# Patient Record
Sex: FEMALE | Race: WHITE | NOT HISPANIC OR LATINO | Employment: FULL TIME | URBAN - METROPOLITAN AREA
[De-identification: names, ages, dates, MRNs, and addresses within clinical notes are randomized per-mention and may not be internally consistent; named-entity substitution may affect disease eponyms.]

---

## 2018-06-08 ENCOUNTER — APPOINTMENT (OUTPATIENT)
Dept: RADIOLOGY | Facility: CLINIC | Age: 53
End: 2018-06-08
Payer: COMMERCIAL

## 2018-06-08 ENCOUNTER — OFFICE VISIT (OUTPATIENT)
Dept: OBGYN CLINIC | Facility: CLINIC | Age: 53
End: 2018-06-08
Payer: COMMERCIAL

## 2018-06-08 VITALS
BODY MASS INDEX: 28.49 KG/M2 | SYSTOLIC BLOOD PRESSURE: 116 MMHG | HEIGHT: 68 IN | DIASTOLIC BLOOD PRESSURE: 72 MMHG | WEIGHT: 188 LBS | HEART RATE: 78 BPM

## 2018-06-08 DIAGNOSIS — M54.5 RIGHT LOW BACK PAIN, UNSPECIFIED CHRONICITY, WITH SCIATICA PRESENCE UNSPECIFIED: ICD-10-CM

## 2018-06-08 DIAGNOSIS — M53.3 SI (SACROILIAC) JOINT DYSFUNCTION: ICD-10-CM

## 2018-06-08 DIAGNOSIS — M62.830 LUMBAR PARASPINAL MUSCLE SPASM: ICD-10-CM

## 2018-06-08 DIAGNOSIS — M54.16 LUMBAR RADICULOPATHY: Primary | ICD-10-CM

## 2018-06-08 PROCEDURE — 72110 X-RAY EXAM L-2 SPINE 4/>VWS: CPT

## 2018-06-08 PROCEDURE — 99204 OFFICE O/P NEW MOD 45 MIN: CPT | Performed by: FAMILY MEDICINE

## 2018-06-08 RX ORDER — CYCLOBENZAPRINE HCL 10 MG
10 TABLET ORAL 3 TIMES DAILY PRN
Qty: 30 TABLET | Refills: 0 | Status: SHIPPED | OUTPATIENT
Start: 2018-06-08 | End: 2018-08-23

## 2018-06-08 RX ORDER — MELOXICAM 7.5 MG/1
7.5 TABLET ORAL 2 TIMES DAILY PRN
Qty: 60 TABLET | Refills: 0 | Status: SHIPPED | OUTPATIENT
Start: 2018-06-08 | End: 2018-08-23

## 2018-06-08 RX ORDER — PREDNISONE 20 MG/1
40 TABLET ORAL DAILY
Qty: 10 TABLET | Refills: 0 | Status: SHIPPED | OUTPATIENT
Start: 2018-06-08 | End: 2018-06-13

## 2018-06-08 NOTE — PROGRESS NOTES
Assessment:     1  Lumbar radiculopathy    2  Right low back pain, unspecified chronicity, with sciatica presence unspecified    3  SI (sacroiliac) joint dysfunction    4  Lumbar paraspinal muscle spasm        Plan:     Problem List Items Addressed This Visit     Lumbar paraspinal muscle spasm    Relevant Medications    cyclobenzaprine (FLEXERIL) 10 mg tablet    predniSONE 20 mg tablet    meloxicam (MOBIC) 7 5 mg tablet    Nerve Stimulator (STANDARD TENS) LOBO    SI (sacroiliac) joint dysfunction    Relevant Medications    cyclobenzaprine (FLEXERIL) 10 mg tablet    predniSONE 20 mg tablet    meloxicam (MOBIC) 7 5 mg tablet    Nerve Stimulator (STANDARD TENS) LOBO      Other Visit Diagnoses     Lumbar radiculopathy    -  Primary    Relevant Medications    cyclobenzaprine (FLEXERIL) 10 mg tablet    predniSONE 20 mg tablet    meloxicam (MOBIC) 7 5 mg tablet    Nerve Stimulator (STANDARD TENS) LOBO    Right low back pain, unspecified chronicity, with sciatica presence unspecified        Relevant Orders    XR spine lumbar minimum 4 views non injury         Subjective:     Patient ID: Clinton Ortiz is a 46 y o  female  Chief Complaint:  Patient is a 50-year-old female who works as a  presenting today for evaluation of lower back pain  She reports beginning with pain about a week and a half ago after she was carrying a heavy box of cat litter up the stairs  She reported immediate onset of pain in the lower left back  Her pain has continued since that time as a throbbing, achy pain that radiates down the left leg into her left calf  She rates her pain today as a 10/10  She has been using ibuprofen which provides no relief  Symptoms are made worse with prolonged ambulation during 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  She also does report frequent numbness or tingling in the left calf and left foot areas  She currently denies any saddle anesthesia    She denies any loss of bowel or bladder function  She denies any crepitus, warmth or swelling  Allergy:  Allergies   Allergen Reactions    Penicillins Rash     Medications:  all current active meds have been reviewed  Past Medical History:  History reviewed  No pertinent past medical history  Past Surgical History:  Past Surgical History:   Procedure Laterality Date    SINUS SURGERY       Family History:  Family History   Problem Relation Age of Onset    Arthritis Mother     Liver cancer Father     Parkinsonism Father      Social History:  History   Alcohol Use    Yes     Comment: rare     History   Drug Use No     History   Smoking Status    Never Smoker   Smokeless Tobacco    Never Used     Review of Systems   Constitutional: Negative  HENT: Negative  Eyes: Negative  Respiratory: Negative  Cardiovascular: Negative  Gastrointestinal: Negative  Genitourinary: Negative  Musculoskeletal: Positive for arthralgias, back pain and myalgias  Skin: Negative  Allergic/Immunologic: Negative  Neurological: Positive for numbness  Negative for weakness  Hematological: Negative  Psychiatric/Behavioral: Negative  Objective:  BP Readings from Last 1 Encounters:   06/08/18 116/72      Wt Readings from Last 1 Encounters:   06/08/18 85 3 kg (188 lb)      BMI:   Estimated body mass index is 28 59 kg/m² as calculated from the following:    Height as of this encounter: 5' 8" (1 727 m)  Weight as of this encounter: 85 3 kg (188 lb)  BSA:   Estimated body surface area is 1 99 meters squared as calculated from the following:    Height as of this encounter: 5' 8" (1 727 m)  Weight as of this encounter: 85 3 kg (188 lb)  Physical Exam   Constitutional: She is oriented to person, place, and time  Vital signs are normal  She appears well-developed  HENT:   Head: Normocephalic  Eyes: Pupils are equal, round, and reactive to light     Pulmonary/Chest: Effort normal    Neurological: She is alert and oriented to person, place, and time  She displays normal reflexes  She exhibits normal muscle tone  Coordination normal    Skin: Skin is warm and dry  Psychiatric: She has a normal mood and affect  Nursing note and vitals reviewed  Back Exam     Tenderness   The patient is experiencing tenderness in the sacroiliac and lumbar  Range of Motion   Extension: normal   Flexion: normal   Lateral Bend Right: abnormal   Lateral Bend Left: abnormal   Rotation Right: abnormal   Rotation Left: abnormal     Tests   Straight leg raise right: negative  Straight leg raise left: positive    Other   Toe Walk: normal  Heel Walk: normal  Sensation: decreased  Gait: normal   Erythema: no back redness    Comments:  Positive Stork test on the left  Slump test is normal   Nerve root testing is fully intact with foot inversion, eversion and great toe extension  I have personally reviewed pertinent films in PACS

## 2018-06-11 ENCOUNTER — TELEPHONE (OUTPATIENT)
Dept: OBGYN CLINIC | Facility: HOSPITAL | Age: 53
End: 2018-06-11

## 2018-06-11 NOTE — TELEPHONE ENCOUNTER
Patient sees Dr Bert Syed  She was seen on Friday however she is having a great deal of pain that started on Saturday  She cannot sleep  She is taking her medications, but she is very uncomfortable  She is asking for a call back from the doctor directly

## 2018-06-12 ENCOUNTER — OFFICE VISIT (OUTPATIENT)
Dept: OBGYN CLINIC | Facility: CLINIC | Age: 53
End: 2018-06-12
Payer: COMMERCIAL

## 2018-06-12 VITALS
SYSTOLIC BLOOD PRESSURE: 122 MMHG | HEIGHT: 68 IN | WEIGHT: 192 LBS | HEART RATE: 78 BPM | DIASTOLIC BLOOD PRESSURE: 80 MMHG | BODY MASS INDEX: 29.1 KG/M2

## 2018-06-12 DIAGNOSIS — M62.830 LUMBAR PARASPINAL MUSCLE SPASM: ICD-10-CM

## 2018-06-12 DIAGNOSIS — M53.3 SI (SACROILIAC) JOINT DYSFUNCTION: ICD-10-CM

## 2018-06-12 DIAGNOSIS — M54.16 LUMBAR RADICULOPATHY: Primary | ICD-10-CM

## 2018-06-12 PROCEDURE — 99213 OFFICE O/P EST LOW 20 MIN: CPT | Performed by: FAMILY MEDICINE

## 2018-06-12 NOTE — LETTER
June 12, 2018     Patient: Jaswant Pritchett   YOB: 1965   Date of Visit: 6/12/2018       To Whom it May Concern:    Jaswant Pritchett is under my professional care  She was seen in my office on 6/12/2018  She may return to work with limitations Until July 12, 2018       If you have any questions or concerns, please don't hesitate to call           Sincerely,          Francheska Hinojosa DO        CC: No Recipients

## 2018-06-12 NOTE — PROGRESS NOTES
Assessment:     1  Lumbar paraspinal muscle spasm    2  SI (sacroiliac) joint dysfunction    3  Lumbar radiculopathy        Plan:     Problem List Items Addressed This Visit     Lumbar paraspinal muscle spasm - Primary    SI (sacroiliac) joint dysfunction    Lumbar radiculopathy         Subjective:     Patient ID: Guille Goff is a 46 y o  female  Chief Complaint:  Patient today reports worsening of back pain symptoms since her last visit  She has now completed 5 days of prednisone therapy in addition to using a muscle relaxer and meloxicam p r n  which she states provided no relief  She continues with pain radiating down the left leg into the left foot  She continues to report numbness along the lateral aspect of the left lower leg  She continues to deny any saddle anesthesia  She denies any loss of bowel bladder function  Allergy:  Allergies   Allergen Reactions    Penicillins Rash     Medications:  all current active meds have been reviewed  Past Medical History:  History reviewed  No pertinent past medical history  Past Surgical History:  Past Surgical History:   Procedure Laterality Date    SINUS SURGERY       Family History:  Family History   Problem Relation Age of Onset    Arthritis Mother     Liver cancer Father     Parkinsonism Father      Social History:  History   Alcohol Use    Yes     Comment: rare     History   Drug Use No     History   Smoking Status    Never Smoker   Smokeless Tobacco    Never Used     Review of Systems   Constitutional: Negative  HENT: Negative  Eyes: Negative  Respiratory: Negative  Cardiovascular: Negative  Gastrointestinal: Negative  Genitourinary: Negative  Musculoskeletal: Positive for arthralgias, back pain and myalgias  Skin: Negative  Allergic/Immunologic: Negative  Neurological: Positive for numbness  Negative for weakness  Hematological: Negative  Psychiatric/Behavioral: Negative            Objective:  BP Readings from Last 1 Encounters:   06/12/18 122/80      Wt Readings from Last 1 Encounters:   06/12/18 87 1 kg (192 lb)      BMI:   Estimated body mass index is 29 19 kg/m² as calculated from the following:    Height as of this encounter: 5' 8" (1 727 m)  Weight as of this encounter: 87 1 kg (192 lb)  BSA:   Estimated body surface area is 2 01 meters squared as calculated from the following:    Height as of this encounter: 5' 8" (1 727 m)  Weight as of this encounter: 87 1 kg (192 lb)  Physical Exam   Constitutional: She is oriented to person, place, and time  Vital signs are normal  She appears well-developed  HENT:   Head: Normocephalic  Eyes: Pupils are equal, round, and reactive to light  Pulmonary/Chest: Effort normal    Neurological: She is alert and oriented to person, place, and time  She displays normal reflexes  She exhibits normal muscle tone  Coordination normal    Skin: Skin is warm and dry  Psychiatric: She has a normal mood and affect  Nursing note and vitals reviewed  Back Exam     Tenderness   The patient is experiencing tenderness in the sacroiliac and lumbar  Range of Motion   Extension: normal   Flexion: normal   Lateral Bend Right: abnormal   Lateral Bend Left: abnormal   Rotation Right: abnormal   Rotation Left: abnormal     Tests   Straight leg raise right: negative  Straight leg raise left: positive    Other   Toe Walk: normal  Heel Walk: normal  Sensation: decreased  Gait: normal   Erythema: no back redness    Comments:  Positive Stork test on the left  Slump test is normal   Nerve root testing is fully intact with foot inversion, eversion and great toe extension  I have personally reviewed pertinent films in PACS

## 2018-06-18 ENCOUNTER — EVALUATION (OUTPATIENT)
Dept: PHYSICAL THERAPY | Facility: CLINIC | Age: 53
End: 2018-06-18
Payer: COMMERCIAL

## 2018-06-18 DIAGNOSIS — M54.16 LUMBAR RADICULOPATHY: ICD-10-CM

## 2018-06-18 DIAGNOSIS — M53.3 SI (SACROILIAC) JOINT DYSFUNCTION: ICD-10-CM

## 2018-06-18 DIAGNOSIS — M62.830 LUMBAR PARASPINAL MUSCLE SPASM: ICD-10-CM

## 2018-06-18 PROCEDURE — G8991 OTHER PT/OT GOAL STATUS: HCPCS | Performed by: PHYSICAL THERAPIST

## 2018-06-18 PROCEDURE — 97140 MANUAL THERAPY 1/> REGIONS: CPT | Performed by: PHYSICAL THERAPIST

## 2018-06-18 PROCEDURE — 97162 PT EVAL MOD COMPLEX 30 MIN: CPT | Performed by: PHYSICAL THERAPIST

## 2018-06-18 PROCEDURE — G8990 OTHER PT/OT CURRENT STATUS: HCPCS | Performed by: PHYSICAL THERAPIST

## 2018-06-18 NOTE — PROGRESS NOTES
PT Evaluation     Today's date: 2018  Patient name: Nickie Scott  : 1965  MRN: [id number]  Referring provider: Rock Bren DO  Dx:   Encounter Diagnosis     ICD-10-CM    1  Lumbar paraspinal muscle spasm M62 830 Ambulatory referral to Physical Therapy   2  SI (sacroiliac) joint dysfunction M53 3 Ambulatory referral to Physical Therapy   3  Lumbar radiculopathy M54 16 Ambulatory referral to Physical Therapy                  Assessment  Impairments: abnormal muscle tone, abnormal or restricted ROM, activity intolerance, impaired physical strength, lacks appropriate home exercise program and pain with function    Assessment details: Nickie Scott is a 46 y o  female presenting as an outpatient to [place] [address] PT w/ c/o/dx  of Lumbar paraspinal muscle spasm  SI (sacroiliac) joint dysfunction  Lumbar radiculopathy  Pt presents w/ increased pain, decreased ROM,  decreased strength, and hypertonicity of surrounding musculature significantly limiting pt's functional ability  Pt will benefit from skilled PT services to address the above deficits in order to max function to allow pt to achieve goals in PT  Thank you for the referral of this pt  Barriers to therapy: High Deductible may limit duration of tx  Understanding of Dx/Px/POC: good   Prognosis: good    Goals  ST  Pain decreased by 25% in 4-6 weeks  2  ROM increased by 25% in 4-6 weeks  3  Strength increased by 1/2 to 1 muscle grade in all deficient muscle groups in 4-6 weeks  LT  Decrease pain to 1-2/10 at worst by d/c   2  Increase ROM to [facility] for all deficient movements by d/c   3  Strength increased to 5 for all deficient muscle groups by d/c   4   IADL performance increased to max function by d/c   5  Recreational performance increased to max function by d/c   6  Pt will tolerate a full work day w/ 2-3/10 pain at worst by d/c   7  Ambulation/stair climbing improved to max level of function by d/c   8  Sitting tolerance improved to max level of function by d/c  Plan  Planned modality interventions: cryotherapy and ultrasound  Other planned modality interventions: other modalities PRN  Planned therapy interventions: abdominal trunk stabilization, ADL retraining, IADL retraining, flexibility, functional ROM exercises, gait training, graded exercise, home exercise program, manual therapy, joint mobilization, neuromuscular re-education, patient education, postural training, strengthening, therapeutic exercise and therapeutic activities  Other planned therapy interventions: other interventions PRN  Frequency: 1-2x/week  Duration in weeks: 8  Treatment plan discussed with: patient        Subjective Evaluation    History of Present Illness  Mechanism of injury: Pt reports to IE w/ c/o L lumbosacral pain  Current c/o pain started approximately 3 weeks ago after carrying a cat box upstairs  Pt w/ hx of chronic LBP involving disc herniations  She reports initially injuring back 18 years ago carrying her 2 y/o son up stairs w/ other heavy objects  She reports that she attended a PT visit at that time and was given extension based exercises to manage her back pain through the years  In the past, re-exacerbation of symptoms would resolve w/ extension based exercises  However, pt reports that this time pain was different and did not improve  She adds that she has been experiencing parasthesias/decreased sensation t/o posterolateral LLE and foot  She denies any bowel/bladder changes and/or saddle anesthesia  Pt scheduled for MRI this Friday,   Pain  Current pain ratin (achey)  At worst pain rating: 10  Location: L lumbosacral area w/ LLE radicular symptoms  Relieving factors: medications (cyclobenzaprine; walking for short distances home TENS unit)  Exacerbated by: extended periods of ambulation, sitting > 5-10 minutes, sleeping, stair climbing (reciprocating fashion), flexing/squatting      Social Support  Steps to enter house: yes (11 w/ rails)  Stairs in house: yes (1 FF steps)   Lives in: multiple-level home  Lives with: spouse    Employment status: working ()    Diagnostic Tests  MRI studies: abnormal (MRI from 25 and 8 years ago report disc herniations; pt scheduled for new MRI this Friday, 6/22)  Patient Goals  Patient goals for therapy: decreased pain          Objective     Active Range of Motion     Lumbar   Flexion: Active lumbar flexion: 50% * L lumbosacral pain  Extension: Active lumbar extension: 50% * L lumbosacral pain  Left lateral flexion: WFL  Right lateral flexion: Active right lumbar lateral flexion: *L stretch  WFL  Left rotation: WFL  Right rotation: Active right lumbar rotation: 75%* L lumbosacral pain       Tests     Additional Tests Details  DTR (R/L):   Patellar: 1+/1+    Palpation: Hypertonicity/tenderness w/ palpation to L lumbosacral area, L TFL, L glute    Special Tests (R/L):   SLR: negative/positive  Crossed SLR: negative/negative  Prone Instability Test: negative    Spring Testing: positive    SIJ:   Distraction: negative  p/a sacral pressure: positive  Supine --> sit assessment/LLD: positive for left innominate anterior rotation    Gross Hip Strength (R/L):   Flexion: 4+/4+  Abduction (L): 4 * pain   ER (L): 4+ *pain        Daily Treatment Diary  **Pt has high deductible- plan to only keep pt for 2 units**    EPOC: 8/13/18  Precautions: None  Co- Morbidities:   Patient Active Problem List   Diagnosis    Lumbar paraspinal muscle spasm    SI (sacroiliac) joint dysfunction    Lumbar radiculopathy       Manual  6/18                     SI MET f/b shotgun tech RS                      TPR to L lateral hip, L glute (in R s/l w/ pillow support) RS                                                                      Total time 15'                        Exercise Diary  6/18                     HEP instruction/hand out 5'            Recumbent Bike Piriformis Stretch             Figure 4 stretch             DKTC             LTR                       DLS: PPT                       DLS: hip abduction                       DLS: hip adduction                       DLS: marches                       Eliana Arriaga                       sidestepping                                                                                                                                                                                                                         Modalities  6/18                      CP PRN 10'

## 2018-06-21 ENCOUNTER — APPOINTMENT (OUTPATIENT)
Dept: PHYSICAL THERAPY | Facility: CLINIC | Age: 53
End: 2018-06-21
Payer: COMMERCIAL

## 2018-06-22 ENCOUNTER — TELEPHONE (OUTPATIENT)
Dept: OBGYN CLINIC | Facility: HOSPITAL | Age: 53
End: 2018-06-22

## 2018-06-22 ENCOUNTER — HOSPITAL ENCOUNTER (OUTPATIENT)
Dept: MRI IMAGING | Facility: HOSPITAL | Age: 53
Discharge: HOME | End: 2018-06-22
Attending: FAMILY MEDICINE
Payer: COMMERCIAL

## 2018-06-22 DIAGNOSIS — M62.830 LUMBAR PARASPINAL MUSCLE SPASM: ICD-10-CM

## 2018-06-22 DIAGNOSIS — M54.16 LUMBAR RADICULOPATHY: ICD-10-CM

## 2018-06-22 DIAGNOSIS — M53.3 SI (SACROILIAC) JOINT DYSFUNCTION: ICD-10-CM

## 2018-06-22 PROCEDURE — 72148 MRI LUMBAR SPINE W/O DYE: CPT

## 2018-06-22 NOTE — TELEPHONE ENCOUNTER
Lorelei Ayoub  [phone]  Alonso Borrero ()  Dr Missael Servinp    Patients  is requesting peer to peer with Salina Morin to get approval for MRI which is scheduled 6/22/18 @ 2pm

## 2018-06-25 ENCOUNTER — APPOINTMENT (OUTPATIENT)
Dept: PHYSICAL THERAPY | Facility: CLINIC | Age: 53
End: 2018-06-25
Payer: COMMERCIAL

## 2018-06-26 ENCOUNTER — OFFICE VISIT (OUTPATIENT)
Dept: OBGYN CLINIC | Facility: CLINIC | Age: 53
End: 2018-06-26
Payer: COMMERCIAL

## 2018-06-26 ENCOUNTER — OFFICE VISIT (OUTPATIENT)
Dept: PHYSICAL THERAPY | Facility: CLINIC | Age: 53
End: 2018-06-26
Payer: COMMERCIAL

## 2018-06-26 VITALS
SYSTOLIC BLOOD PRESSURE: 143 MMHG | WEIGHT: 183.6 LBS | BODY MASS INDEX: 27.83 KG/M2 | HEIGHT: 68 IN | DIASTOLIC BLOOD PRESSURE: 88 MMHG | HEART RATE: 76 BPM

## 2018-06-26 DIAGNOSIS — M53.3 SI (SACROILIAC) JOINT DYSFUNCTION: ICD-10-CM

## 2018-06-26 DIAGNOSIS — M51.26 HERNIATION OF INTERVERTEBRAL DISC OF LUMBAR REGION: ICD-10-CM

## 2018-06-26 DIAGNOSIS — M54.16 LUMBAR RADICULOPATHY: ICD-10-CM

## 2018-06-26 DIAGNOSIS — M62.830 LUMBAR PARASPINAL MUSCLE SPASM: Primary | ICD-10-CM

## 2018-06-26 DIAGNOSIS — M54.16 LUMBAR RADICULOPATHY: Primary | ICD-10-CM

## 2018-06-26 PROCEDURE — 97140 MANUAL THERAPY 1/> REGIONS: CPT

## 2018-06-26 PROCEDURE — 97110 THERAPEUTIC EXERCISES: CPT

## 2018-06-26 PROCEDURE — 99214 OFFICE O/P EST MOD 30 MIN: CPT | Performed by: FAMILY MEDICINE

## 2018-06-26 RX ORDER — TRAMADOL HYDROCHLORIDE 50 MG/1
50 TABLET ORAL EVERY 6 HOURS PRN
Qty: 30 TABLET | Refills: 0 | Status: SHIPPED | OUTPATIENT
Start: 2018-06-26 | End: 2018-06-26 | Stop reason: SDUPTHER

## 2018-06-26 RX ORDER — TRAMADOL HYDROCHLORIDE 50 MG/1
50 TABLET ORAL EVERY 6 HOURS PRN
Qty: 30 TABLET | Refills: 0 | Status: SHIPPED | OUTPATIENT
Start: 2018-06-26 | End: 2018-08-23

## 2018-06-26 NOTE — PROGRESS NOTES
Daily Note     Today's date: 2018  Patient name: Cresencio Benitez  : 1965  MRN: [id number]  Referring provider: Antonia Blake DO  Dx:   Encounter Diagnosis     ICD-10-CM    1  Lumbar paraspinal muscle spasm M62 830    2  SI (sacroiliac) joint dysfunction M53 3                   Subjective: Pt reports the numbness is not as intense  Pt state it had felt like walking on sand  Pt reports she still has numbness to the foot but not as intense  Objective: See treatment diary below      Assessment: Tolerated treatment fair  Patient had slight increase in pain post Rx  Pt w/ post rot, performed SI MET  Plan: Continue per plan of care  Progress treatment as tolerated      Daily Treatment Diary  **Pt has high deductible- plan to only keep pt for 2 units**    EPOC: 18  Precautions: None  Co- Morbidities:   Patient Active Problem List   Diagnosis    Lumbar paraspinal muscle spasm    SI (sacroiliac) joint dysfunction    Lumbar radiculopathy       Manual                     SI MET f/b shotgun tech RS  JK                    TPR to L lateral hip, L glute (in R s/l w/ pillow support) RS                                                                      Total time 15'                        Exercise Diary                     HEP instruction/hand out 5'            Recumbent Bike    5'                   Piriformis Stretch  3x20"           Figure 4 stretch  3x20"           DKTC  3x20"           LTR                       DLS: PPT    21X34"                   DLS: hip abduction    10x10" iso                   DLS: hip adduction    10x10" iso                   DLS: marches                       Bridges                       Clamshells                       sidestepping                        active HS strtch    1' Modalities  6/18 6/26                    CP PRN 10'  dfer due to time

## 2018-06-26 NOTE — PROGRESS NOTES
Assessment:     1  Lumbar radiculopathy    2  SI (sacroiliac) joint dysfunction    3  Herniation of intervertebral disc of lumbar region        Plan:     Problem List Items Addressed This Visit     SI (sacroiliac) joint dysfunction    Relevant Medications    traMADol (ULTRAM) 50 mg tablet    Other Relevant Orders    Ambulatory referral to Orthopedic Surgery    Lumbar radiculopathy - Primary    Relevant Medications    traMADol (ULTRAM) 50 mg tablet    Other Relevant Orders    Ambulatory referral to Orthopedic Surgery    Herniation of intervertebral disc of lumbar region    Relevant Medications    traMADol (ULTRAM) 50 mg tablet    Other Relevant Orders    Ambulatory referral to Orthopedic Surgery         Subjective:     Patient ID: Romana Ma is a 46 y o  female  Chief Complaint:  Patient presents today for follow-up of low back pain and MRI results  Pain continues today is a throbbing, achy pain which she rates as a 10 out 10  She has now completed 5 days of prednisone therapy in addition to using a muscle relaxer and meloxicam p r n  which she states provided no relief  She continues with pain radiating down the left leg into the left foot  She continues to report numbness along the lateral aspect of the left lower leg  She continues to deny any saddle anesthesia  She denies any loss of bowel bladder function        Allergy:  Allergies   Allergen Reactions    Penicillins Rash     Medications:  all current active meds have been reviewed  Past Medical History:  Past Medical History:   Diagnosis Date    Chronic back pain      Past Surgical History:  Past Surgical History:   Procedure Laterality Date    SINUS SURGERY       Family History:  Family History   Problem Relation Age of Onset    Arthritis Mother     Liver cancer Father     Parkinsonism Father      Social History:  History   Alcohol Use    Yes     Comment: rare     History   Drug Use No     History   Smoking Status    Never Smoker   Smokeless Tobacco    Never Used     Review of Systems   Constitutional: Negative  HENT: Negative  Eyes: Negative  Respiratory: Negative  Cardiovascular: Negative  Gastrointestinal: Negative  Genitourinary: Negative  Musculoskeletal: Positive for arthralgias, back pain and myalgias  Skin: Negative  Allergic/Immunologic: Negative  Neurological: Positive for numbness  Negative for weakness  Hematological: Negative  Psychiatric/Behavioral: Negative  Objective:  BP Readings from Last 1 Encounters:   06/26/18 143/88      Wt Readings from Last 1 Encounters:   06/26/18 83 3 kg (183 lb 9 6 oz)      BMI:   Estimated body mass index is 27 92 kg/m² as calculated from the following:    Height as of this encounter: 5' 8" (1 727 m)  Weight as of this encounter: 83 3 kg (183 lb 9 6 oz)  BSA:   Estimated body surface area is 1 97 meters squared as calculated from the following:    Height as of this encounter: 5' 8" (1 727 m)  Weight as of this encounter: 83 3 kg (183 lb 9 6 oz)  Physical Exam   Constitutional: She is oriented to person, place, and time  Vital signs are normal  She appears well-developed  HENT:   Head: Normocephalic  Eyes: Pupils are equal, round, and reactive to light  Pulmonary/Chest: Effort normal    Musculoskeletal: Normal range of motion  Neurological: She is alert and oriented to person, place, and time  Skin: Skin is warm and dry  Psychiatric: She has a normal mood and affect  Nursing note and vitals reviewed  Back Exam     Tenderness   The patient is experiencing tenderness in the sacroiliac and lumbar  Range of Motion   Extension: normal   Flexion: normal     Tests   Straight leg raise right: negative    Other   Toe Walk: normal  Heel Walk: normal  Sensation: decreased  Gait: normal   Erythema: no back redness    Comments:  Positive Stork test on the left    Slump test is normal   Nerve root testing is fully intact with foot inversion, eversion and great toe extension  I have personally reviewed pertinent films in PACS  L1-L2:  There is a small left subarticular disc herniation minimally distorting the ventral aspect of the thecal sac, series 5 image 3  The canal is widely patent  No nerve impingement  No foraminal narrowing    L2-L3:  Moderate broad-based left subarticular and foraminal disc protrusion  This disc herniation distorts the left anterolateral aspect of the thecal sac  Only slight left foraminal narrowing without discrete nerve impingement    L3-L4:  Normal disc height and signal   No disc herniation, canal stenosis or foraminal narrowing    L4-L5:  Disc desiccation with slight loss of disc height  Moderate diffuse annular bulging with a broad-based central, left paracentral and subarticular disc herniation with some degree of extrusion  There is significant mass effect upon the left anterolateral aspect of the thecal sac and left L5 nerve  Only minimal foraminal narrowing without discrete L4 nerve impingement   L5-S1:  Disc desiccation and loss of disc height  Small broad-based left foraminal disc protrusion  Mild endplate hypertrophic change and slight facet arthropathy  No significant canal stenosis  Mild left greater than right foraminal narrowing

## 2018-06-28 ENCOUNTER — APPOINTMENT (OUTPATIENT)
Dept: PHYSICAL THERAPY | Facility: CLINIC | Age: 53
End: 2018-06-28
Payer: COMMERCIAL

## 2018-07-02 ENCOUNTER — TELEPHONE (OUTPATIENT)
Dept: OBGYN CLINIC | Facility: HOSPITAL | Age: 53
End: 2018-07-02

## 2018-07-02 NOTE — TELEPHONE ENCOUNTER
Patient sees Dr Bert Roberto, patient's  is calling asking to speak with you directly  He is stating that you will know what it's about but clarifed stating it was about you helping them recover money form joni Frazier expects a call back asap

## 2018-07-09 ENCOUNTER — OFFICE VISIT (OUTPATIENT)
Dept: OBGYN CLINIC | Facility: HOSPITAL | Age: 53
End: 2018-07-09
Attending: FAMILY MEDICINE
Payer: COMMERCIAL

## 2018-07-09 VITALS
SYSTOLIC BLOOD PRESSURE: 135 MMHG | HEIGHT: 68 IN | BODY MASS INDEX: 27.74 KG/M2 | WEIGHT: 183 LBS | DIASTOLIC BLOOD PRESSURE: 82 MMHG | HEART RATE: 90 BPM

## 2018-07-09 DIAGNOSIS — M54.16 LUMBAR RADICULOPATHY: Primary | ICD-10-CM

## 2018-07-09 DIAGNOSIS — M53.3 SI (SACROILIAC) JOINT DYSFUNCTION: ICD-10-CM

## 2018-07-09 DIAGNOSIS — M51.26 HERNIATION OF INTERVERTEBRAL DISC OF LUMBAR REGION: ICD-10-CM

## 2018-07-09 DIAGNOSIS — M54.5 ACUTE LEFT-SIDED LOW BACK PAIN, WITH SCIATICA PRESENCE UNSPECIFIED: ICD-10-CM

## 2018-07-09 PROCEDURE — 99243 OFF/OP CNSLTJ NEW/EST LOW 30: CPT | Performed by: ORTHOPAEDIC SURGERY

## 2018-07-09 NOTE — PROGRESS NOTES
Assessment/Plan:    Plan:  Refer to Pain Management for lumbar injecitons  Follow up 6 weeks       Problem List Items Addressed This Visit        Nervous and Auditory    Lumbar radiculopathy - Primary     Patient presents for evaluation of lower back and leg pains with associated numbness and tingling  She reports complete numbness with tingling and burning sensations from her left lower back into her toes  On the left side she reports pain that radiates to the knee  She denies carole weakness  She does have a history of osteoarthritis  She struggles with being overweight  On physical exam   She is overweight  Lumbar mobility is limited in extremes of flexion and extension  She has good strength L2-S1 bilaterally  Absent reflex on the left at S1   2+ at L4 bilaterally  Relevant Orders    Ambulatory referral to Pain Management       Musculoskeletal and Integument    SI (sacroiliac) joint dysfunction    Herniation of intervertebral disc of lumbar region      Other Visit Diagnoses     Acute left-sided low back pain, with sciatica presence unspecified        Relevant Orders    Ambulatory referral to Pain Management            Subjective:      Patient ID: Clinton Ortiz is a 53 y o  female  Patient is referred by Dr Felicia Shannon for consultation  Patient is here today for evaluation for low back pain  Patient notes she has been having pain for about 1-2 months  Patient notes she is having pain on the Left side of low back and pain down the Left lower posterior leg to foot  Review of Systems   Constitutional: Negative  HENT: Negative  Eyes: Negative  Respiratory: Negative  Endocrine: Negative  Musculoskeletal: Positive for back pain  Skin: Negative  Hematological: Negative  Objective:      /82   Pulse 90   Ht 5' 8" (1 727 m)   Wt 83 kg (183 lb)   BMI 27 83 kg/m²          Physical Exam   Constitutional: She is oriented to person, place, and time   She appears well-developed and well-nourished  HENT:   Head: Normocephalic and atraumatic  Eyes: Pupils are equal, round, and reactive to light  Neck: Neck supple  Cardiovascular: Normal rate and regular rhythm  Pulmonary/Chest: Effort normal and breath sounds normal    Neurological: She is alert and oriented to person, place, and time  Skin: Skin is warm and dry  Psychiatric: She has a normal mood and affect             Lumbar exam  B/L L2-S1 strengthen  5/5  Diminishing  L5 Left than the right  Diminishing  S1 left than the right   Reflux 2 plus 4  B/L L4  Right S1  1plus Left side absent

## 2018-07-09 NOTE — ASSESSMENT & PLAN NOTE
Patient presents for evaluation of lower back and leg pains with associated numbness and tingling  She reports complete numbness with tingling and burning sensations from her left lower back into her toes  On the left side she reports pain that radiates to the knee  She denies carole weakness  She does have a history of osteoarthritis  She struggles with being overweight  On physical exam   She is overweight  Lumbar mobility is limited in extremes of flexion and extension  She has good strength L2-S1 bilaterally  Absent reflex on the left at S1   2+ at L4 bilaterally

## 2018-08-23 ENCOUNTER — OFFICE VISIT (OUTPATIENT)
Dept: PAIN MEDICINE | Facility: CLINIC | Age: 53
End: 2018-08-23
Payer: COMMERCIAL

## 2018-08-23 VITALS
WEIGHT: 188 LBS | SYSTOLIC BLOOD PRESSURE: 110 MMHG | HEART RATE: 66 BPM | BODY MASS INDEX: 28.49 KG/M2 | DIASTOLIC BLOOD PRESSURE: 70 MMHG | HEIGHT: 68 IN

## 2018-08-23 DIAGNOSIS — M54.16 LUMBAR RADICULOPATHY: ICD-10-CM

## 2018-08-23 DIAGNOSIS — M51.26 LUMBAR DISC HERNIATION: Primary | ICD-10-CM

## 2018-08-23 PROCEDURE — 99244 OFF/OP CNSLTJ NEW/EST MOD 40: CPT | Performed by: ANESTHESIOLOGY

## 2018-08-23 NOTE — PROGRESS NOTES
Assessment:  1  Lumbar disc herniation - Left    2  Lumbar radiculopathy - Left        Plan:    The patient's pain persists despite time, relative rest, activity modification and therapy  I believe that she would benefit from a lumbar epidural steroid injection to diminish any inflammatory component of her pain  I will initially use a transforaminal approach to better concentrate the steroid along the affected nerve root, left L5  The injection may need to be repeated based on the degree of pain relief following the initial injection  In the office today, we reviewed the nature of the patient's pathology in depth using  diagrams and models  I discussed the approach I would use for the epidural steroid injection and provided literature for home review  The patient understands the risks associated with the procedure including but not limited to bleeding, infection, tissue injury, exacerbation of symptoms, allergic reaction, spinal headache, and paralysis and provided written and verbal consent  today  My impressions and treatment recommendations were discussed in detail with the patient who verbalized understanding and had no further questions  Discharge instructions were provided  I personally saw and examined the patient and I agree with the above discussed plan of care  Orders Placed This Encounter   Procedures    FL spine and pain procedure     Standing Status:   Future     Standing Expiration Date:   8/23/2022     Order Specific Question:   Reason for Exam:     Answer:   Left L5 TFESI #1     Order Specific Question:   Is the patient pregnant? Answer:   Unknown     Order Specific Question:   Anticoagulant hold needed? Answer:   no    FL spine and pain procedure     Standing Status:   Future     Standing Expiration Date:   8/23/2022     Order Specific Question:   Reason for Exam:     Answer:   Left L5 TFESI #2     Order Specific Question:   Is the patient pregnant?      Answer:   Unknown Order Specific Question:   Anticoagulant hold needed? Answer:   no     No orders of the defined types were placed in this encounter  referred by dr Hermann Cruz       History of Present Illness:    Rajendra Suarez is a 46 y  o  who was lifting a heavy box up the stairs on May 31, 2018  Her pain started the next day and became severe on June 7th  She could not lying in bed she missed 2 days of work  She followed up with orthopedics who prescribed medications moderate activity TENS unit a few days later with her pain becoming severe he ordered MRI prescribed physical therapy  On June 18th physical therapy started she had the MRI physical therapy being pain became severe so she followed with orthopedic surgery  Her pain is moderate to severe rates it as 9/10 on the visual analog scale is constant describes sharp achy burning with a pressure like sensation has subjective weakness of the lower limbs  She reports lying down and relaxation decreases or symptoms will most activities aggravate it  Physical therapy heat ice and chiropractic treatment provided no relief for exercise and TENS as provided moderate relief  I have personally reviewed and/or updated the patient's past medical history, past surgical history, family history, social history, current medications, allergies, and vital signs today  Review of Systems:    Review of Systems   Constitutional: Negative for fever and unexpected weight change  HENT: Negative for trouble swallowing  Eyes: Negative for visual disturbance  Respiratory: Negative for shortness of breath and wheezing  Cardiovascular: Negative for chest pain and palpitations  Gastrointestinal: Negative for constipation, diarrhea, nausea and vomiting  Endocrine: Negative for cold intolerance, heat intolerance and polydipsia  Genitourinary: Negative for difficulty urinating and frequency  Musculoskeletal: Positive for gait problem   Negative for arthralgias, joint swelling and myalgias  Skin: Negative for rash  Neurological: Positive for weakness  Negative for dizziness, seizures, syncope and headaches  Hematological: Does not bruise/bleed easily  Psychiatric/Behavioral: Negative for dysphoric mood  All other systems reviewed and are negative  Patient Active Problem List   Diagnosis    Lumbar paraspinal muscle spasm    SI (sacroiliac) joint dysfunction    Lumbar radiculopathy    Herniation of intervertebral disc of lumbar region    Lumbar disc herniation - Left       Past Medical History:   Diagnosis Date    Chronic back pain        Past Surgical History:   Procedure Laterality Date    SINUS SURGERY         Family History   Problem Relation Age of Onset    Arthritis Mother     Liver cancer Father     Parkinsonism Father        Social History     Occupational History    Not on file  Social History Main Topics    Smoking status: Never Smoker    Smokeless tobacco: Never Used    Alcohol use Yes      Comment: rare    Drug use: No    Sexual activity: Not on file       Current Outpatient Prescriptions on File Prior to Visit   Medication Sig    [DISCONTINUED] cyclobenzaprine (FLEXERIL) 10 mg tablet Take 1 tablet (10 mg total) by mouth 3 (three) times a day as needed for muscle spasms (Patient not taking: Reported on 8/23/2018 )    [DISCONTINUED] meloxicam (MOBIC) 7 5 mg tablet Take 1 tablet (7 5 mg total) by mouth 2 (two) times a day as needed for mild pain or moderate pain (Patient not taking: Reported on 8/23/2018 )    [DISCONTINUED] Nerve Stimulator (STANDARD TENS) LOBO by Does not apply route 2 (two) times a day (Patient not taking: Reported on 8/23/2018 )    [DISCONTINUED] traMADol (ULTRAM) 50 mg tablet Take 1 tablet (50 mg total) by mouth every 6 (six) hours as needed for moderate pain (Patient not taking: Reported on 8/23/2018 )     No current facility-administered medications on file prior to visit          Allergies   Allergen Reactions    Penicillins Rash       Physical Exam:    /70   Pulse 66   Ht 5' 8" (1 727 m)   Wt 85 3 kg (188 lb)   BMI 28 59 kg/m²     Constitutional: normal, well developed, well nourished, alert, in no distress and non-toxic and no overt pain behavior  Eyes: anicteric  HEENT: grossly intact  Neck: supple, symmetric, trachea midline and no masses   Pulmonary:even and unlabored  Cardiovascular:No edema or pitting edema present  Skin:Normal without rashes or lesions and well hydrated  Psychiatric:Mood and affect appropriate  Neurologic:Cranial Nerves II-XII grossly intact  Musculoskeletal:normal     Inspection:  Difficulty going from sitting to standing to sitting position Normal lumbar lordotic curve with no significant scoliosis or spinal step-off  Skin intact without erythema  No gross mass or muscle atrophy  Palpation:  There is no tenderness to palpation overlying the sacroiliac joint as well as the ischial bursa bilateral   No significant tenderness over the greater trochanteric bursa bilaterally  Motor/Strength:  5/5 strength in the bilateral lower limbs  The patient is able to heel and/toe walk the with some difficulty and pain  Tandem gait is intact  Reflexes: Deep tendon reflex are 2+ and symmetrical bilateral patella absent left Achilles 2+ right Achilles  Sensation:   Sensation intact to light touch and pinprick in the bilateral lower limbs  Proprioception is intact at bilateral hallux  Maneuvers:   Positive straight leg raising on the lef  tManeuvers: Negative Ross's maneuver  Imaging  MRI LUMBAR SPINE WITHOUT CONTRAST 6/22/18     INDICATION: M62 830: Muscle spasm of back  M53 3: Sacrococcygeal disorders, not elsewhere classified  M54 16: Radiculopathy, lumbar region   Lifting injury    Left leg and foot pain/numbness      COMPARISON:  None      TECHNIQUE:  Sagittal T1, sagittal T2, sagittal inversion recovery, axial T1 and axial T2, coronal T2        IMAGE QUALITY: Diagnostic     FINDINGS:     ALIGNMENT:  Normal alignment of the lumbar spine  No compression fracture  No spondylolysis or spondylolisthesis  No scoliosis      MARROW SIGNAL:  Normal marrow signal is identified within the visualized bony structures  No discrete marrow lesion      DISTAL CORD AND CONUS:  Normal size and signal within the distal cord and conus  The conus ends at the L2 level      PARASPINAL SOFT TISSUES:  Paraspinal soft tissues are unremarkable      SACRUM:  Normal signal within the sacrum  No evidence of insufficiency or stress fracture      LOWER THORACIC DISC SPACES:  Normal disc height and signal   No disc herniation, canal stenosis or foraminal narrowing      LUMBAR DISC SPACES:     L1-L2:  There is a small left subarticular disc herniation minimally distorting the ventral aspect of the thecal sac, series 5 image 3  The canal is widely patent  No nerve impingement  No foraminal narrowing      L2-L3:  Moderate broad-based left subarticular and foraminal disc protrusion  This disc herniation distorts the left anterolateral aspect of the thecal sac  Only slight left foraminal narrowing without discrete nerve impingement      L3-L4:  Normal disc height and signal   No disc herniation, canal stenosis or foraminal narrowing      L4-L5:  Disc desiccation with slight loss of disc height  Moderate diffuse annular bulging with a broad-based central, left paracentral and subarticular disc herniation with some degree of extrusion  There is significant mass effect upon the left   anterolateral aspect of the thecal sac and left L5 nerve  Only minimal foraminal narrowing without discrete L4 nerve impingement      L5-S1:  Disc desiccation and loss of disc height  Small broad-based left foraminal disc protrusion  Mild endplate hypertrophic change and slight facet arthropathy  No significant canal stenosis    Mild left greater than right foraminal narrowing      IMPRESSION:     Moderately large somewhat lobulated disc herniation/extrusion at L4-5 on the left distorting the anterolateral aspect of the thecal sac and compressing the L5 nerve  Correlate for corresponding radiculopathy      Small disc herniations at L1-2, L2-3 and L5-S1 without discrete nerve impingement      The study was marked in EPIC for significant notification  Xray LUMBAR SPINE 6/8/18     INDICATION:   M54 5: Low back pain      COMPARISON:  None     VIEWS:  XR SPINE LUMBAR MINIMUM 4 VIEWS NON INJURY  Images: 5     FINDINGS:     There is no evidence of acute fracture or destructive osseous lesion      Alignment is unremarkable      L4-5 and L5-S1 degenerative disc disease noted with disc space loss, endplate sclerosis, and osteophytosis      The pedicles appear intact      Visualized soft tissues appear unremarkable      IMPRESSION:     No acute osseous abnormality        Degenerative changes as described      I have personally reviewed pertinent films in PACS

## 2018-08-28 ENCOUNTER — HOSPITAL ENCOUNTER (OUTPATIENT)
Dept: RADIOLOGY | Facility: CLINIC | Age: 53
Discharge: HOME | End: 2018-08-28
Admitting: ANESTHESIOLOGY
Payer: COMMERCIAL

## 2018-08-28 VITALS
HEART RATE: 56 BPM | RESPIRATION RATE: 18 BRPM | TEMPERATURE: 97.6 F | OXYGEN SATURATION: 98 % | DIASTOLIC BLOOD PRESSURE: 85 MMHG | SYSTOLIC BLOOD PRESSURE: 163 MMHG

## 2018-08-28 DIAGNOSIS — M54.16 LUMBAR RADICULOPATHY: ICD-10-CM

## 2018-08-28 DIAGNOSIS — M51.26 LUMBAR DISC HERNIATION: ICD-10-CM

## 2018-08-28 PROCEDURE — 64483 NJX AA&/STRD TFRM EPI L/S 1: CPT | Performed by: ANESTHESIOLOGY

## 2018-08-28 RX ORDER — LIDOCAINE HYDROCHLORIDE 10 MG/ML
5 INJECTION, SOLUTION EPIDURAL; INFILTRATION; INTRACAUDAL; PERINEURAL ONCE
Status: COMPLETED | OUTPATIENT
Start: 2018-08-28 | End: 2018-08-28

## 2018-08-28 RX ORDER — METHYLPREDNISOLONE ACETATE 80 MG/ML
80 INJECTION, SUSPENSION INTRA-ARTICULAR; INTRALESIONAL; INTRAMUSCULAR; PARENTERAL; SOFT TISSUE ONCE
Status: COMPLETED | OUTPATIENT
Start: 2018-08-28 | End: 2018-08-28

## 2018-08-28 RX ADMIN — METHYLPREDNISOLONE ACETATE 80 MG: 80 INJECTION, SUSPENSION INTRA-ARTICULAR; INTRALESIONAL; INTRAMUSCULAR; SOFT TISSUE at 10:09

## 2018-08-28 RX ADMIN — LIDOCAINE HYDROCHLORIDE 4 ML: 10 INJECTION, SOLUTION EPIDURAL; INFILTRATION; INTRACAUDAL; PERINEURAL at 10:09

## 2018-08-28 RX ADMIN — IOHEXOL 1 ML: 300 INJECTION, SOLUTION INTRAVENOUS at 10:15

## 2018-08-28 RX ADMIN — LIDOCAINE HYDROCHLORIDE 5 ML: 20 INJECTION, SOLUTION EPIDURAL; INFILTRATION; INTRACAUDAL at 10:09

## 2018-08-28 NOTE — H&P
History of Present Illness: The patient is a 46 y o  female who presents with complaints of low back and leg pain  Patient Active Problem List   Diagnosis    Lumbar paraspinal muscle spasm    SI (sacroiliac) joint dysfunction    Lumbar radiculopathy    Herniation of intervertebral disc of lumbar region    Lumbar disc herniation - Left       Past Medical History:   Diagnosis Date    Chronic back pain        Past Surgical History:   Procedure Laterality Date    SINUS SURGERY         No current outpatient prescriptions on file  Allergies   Allergen Reactions    Penicillins Rash       Physical Exam:   Vitals:    08/28/18 0957   BP: 148/83   Pulse: 56   Resp: 18   Temp: 97 6 °F (36 4 °C)   SpO2: 99%     General: Awake, Alert, Oriented x 3, Mood and affect appropriate  Respiratory: Respirations even and unlabored  Cardiovascular: Peripheral pulses intact; no edema  Musculoskeletal Exam:   Lumbar spine    ASA Score: II    Patient/Chart Verification  Patient ID Verified: Verbal  Consents Confirmed: Procedural, To be obtained in the Pre-Procedure area  H&P( within 30 days) Verified: To be obtained in the Pre-Procedure area  Interval H&P(within 24 hr) Complete (required for Outpatients and Surgery Admit only): To be obtained in the Pre-Procedure area  Allergies Reviewed: Yes  Anticoag/NSAID held?: NA  Currently on antibiotics?: No    Assessment:   1  Lumbar radiculopathy - Left    2   Lumbar disc herniation - Left        Plan: Left L5 TFESI #1

## 2018-08-29 ENCOUNTER — TELEPHONE (OUTPATIENT)
Dept: PAIN MEDICINE | Facility: CLINIC | Age: 53
End: 2018-08-29

## 2018-09-05 ENCOUNTER — TELEPHONE (OUTPATIENT)
Dept: PAIN MEDICINE | Facility: CLINIC | Age: 53
End: 2018-09-05

## 2018-09-05 NOTE — TELEPHONE ENCOUNTER
Telephone note   Reason for the call    Post Op F/u [facility] [place]      LMTCB w/pain level and % of relief  1st attempt       S/P Left L5 TFESI #1 on 8/28/18 w/[facility] in [place]        Follow up Inj 9/13

## 2018-09-06 NOTE — TELEPHONE ENCOUNTER
Patient is calling back  She states that she is still having some numbness  She had 70% relief from the pain  She is experiencing a 4 out of 10 pain level  She states its not the pain bothering her but the numbness

## 2018-09-13 ENCOUNTER — HOSPITAL ENCOUNTER (OUTPATIENT)
Dept: RADIOLOGY | Facility: CLINIC | Age: 53
Discharge: HOME | End: 2018-09-13
Attending: ANESTHESIOLOGY
Payer: COMMERCIAL

## 2018-09-13 VITALS
DIASTOLIC BLOOD PRESSURE: 77 MMHG | SYSTOLIC BLOOD PRESSURE: 123 MMHG | TEMPERATURE: 98.2 F | RESPIRATION RATE: 18 BRPM | HEART RATE: 75 BPM | OXYGEN SATURATION: 95 %

## 2018-09-13 DIAGNOSIS — M51.26 LUMBAR DISC HERNIATION: ICD-10-CM

## 2018-09-13 DIAGNOSIS — M54.16 LUMBAR RADICULOPATHY: ICD-10-CM

## 2018-09-13 PROCEDURE — 64483 NJX AA&/STRD TFRM EPI L/S 1: CPT | Performed by: ANESTHESIOLOGY

## 2018-09-13 RX ORDER — LIDOCAINE HYDROCHLORIDE 10 MG/ML
5 INJECTION, SOLUTION EPIDURAL; INFILTRATION; INTRACAUDAL; PERINEURAL ONCE
Status: COMPLETED | OUTPATIENT
Start: 2018-09-13 | End: 2018-09-13

## 2018-09-13 RX ORDER — METHYLPREDNISOLONE ACETATE 80 MG/ML
80 INJECTION, SUSPENSION INTRA-ARTICULAR; INTRALESIONAL; INTRAMUSCULAR; PARENTERAL; SOFT TISSUE ONCE
Status: COMPLETED | OUTPATIENT
Start: 2018-09-13 | End: 2018-09-13

## 2018-09-13 RX ADMIN — LIDOCAINE HYDROCHLORIDE 5 ML: 20 INJECTION, SOLUTION EPIDURAL; INFILTRATION; INTRACAUDAL at 13:24

## 2018-09-13 RX ADMIN — LIDOCAINE HYDROCHLORIDE 3 ML: 10 INJECTION, SOLUTION EPIDURAL; INFILTRATION; INTRACAUDAL; PERINEURAL at 13:24

## 2018-09-13 RX ADMIN — METHYLPREDNISOLONE ACETATE 80 MG: 80 INJECTION, SUSPENSION INTRA-ARTICULAR; INTRALESIONAL; INTRAMUSCULAR; SOFT TISSUE at 13:24

## 2018-09-13 RX ADMIN — IOHEXOL 1 ML: 300 INJECTION, SOLUTION INTRAVENOUS at 13:30

## 2018-09-13 NOTE — DISCHARGE INSTRUCTIONS
Epidural Steroid Injection   WHAT YOU NEED TO KNOW:   An epidural steroid injection (AYDEN) is a procedure to inject steroid medicine into the epidural space  The epidural space is between your spinal cord and vertebrae  Steroids reduce inflammation and fluid buildup in your spine that may be causing pain  You may be given pain medicine along with the steroids  ACTIVITY  · Do not drive or operate machinery today  · No strenuous activity today - bending, lifting, etc   · You may resume normal activites starting tomorrow - start slowly and as tolerated  · You may shower today, but no tub baths or hot tubs  · You may have numbness for several hours from the local anesthetic  Please use caution and common sense, especially with weight-bearing activities  CARE OF THE INJECTION SITE  · If you have soreness or pain, apply ice to the area today (20 minutes on/20 minutes off)  · Starting tomorrow, you may use warm, moist heat or ice if needed  · You may have an increase or change in your discomfort for 36-48 hours after your treatment  · Apply ice and continue with any pain medication you have been prescribed  · Notify [facility] if you have any of the following: redness, drainage, swelling, headache, stiff neck or fever above 100°F     SPECIAL INSTRUCTIONS  · Our office will contact you in approximately 7 days for a progress report  MEDICATIONS  · Continue to take all routine medications  · Our office may have instructed you to hold some medications  If you have a problem specifically related to your procedure, please call our office at [phone]  Problems not related to your procedure should be directed to your primary care physician

## 2018-09-13 NOTE — H&P
History of Present Illness: The patient is a 46 y o  female who presents with complaints of low back and leg pain  Patient Active Problem List   Diagnosis    Lumbar paraspinal muscle spasm    SI (sacroiliac) joint dysfunction    Lumbar radiculopathy    Herniation of intervertebral disc of lumbar region    Lumbar disc herniation - Left       Past Medical History:   Diagnosis Date    Chronic back pain        Past Surgical History:   Procedure Laterality Date    SINUS SURGERY         No current outpatient prescriptions on file  Allergies   Allergen Reactions    Penicillins Rash       Physical Exam:   Vitals:    09/13/18 1308   BP: 127/79   Pulse: 76   Resp: 18   Temp: 98 2 °F (36 8 °C)   SpO2: 95%     General: Awake, Alert, Oriented x 3, Mood and affect appropriate  Respiratory: Respirations even and unlabored  Cardiovascular: Peripheral pulses intact; no edema  Musculoskeletal Exam:   Decreased range of motion lumbar spine    ASA Score: II    Patient/Chart Verification  Patient ID Verified: Verbal  ID Band Applied: No  Consents Confirmed: Procedural  H&P( within 30 days) Verified: To be obtained in the Pre-Procedure area  Interval H&P(within 24 hr) Complete (required for Outpatients and Surgery Admit only): To be obtained in the Pre-Procedure area  Allergies Reviewed: Yes  Anticoag/NSAID held?: No  Currently on antibiotics?: No  Pre-op Lab/Test Results Available: N/A    Assessment:   1  Lumbar radiculopathy - Left    2   Lumbar disc herniation - Left        Plan: Left L5 TFESI #2

## 2018-09-20 ENCOUNTER — TELEPHONE (OUTPATIENT)
Dept: PAIN MEDICINE | Facility: CLINIC | Age: 53
End: 2018-09-20

## 2018-09-20 NOTE — TELEPHONE ENCOUNTER
Telephone note   Reason for the call    Post Op F/u [facility] [place]    LMTCB w/pain level and % of relief  1st attempt     S/P Left L5 TFESI #2 on 9/13/18 w/[facility] in [place]      Follow up with DG on 9/27

## 2018-09-27 ENCOUNTER — OFFICE VISIT (OUTPATIENT)
Dept: PAIN MEDICINE | Facility: CLINIC | Age: 53
End: 2018-09-27
Payer: COMMERCIAL

## 2018-09-27 VITALS
WEIGHT: 187 LBS | DIASTOLIC BLOOD PRESSURE: 72 MMHG | HEIGHT: 68 IN | BODY MASS INDEX: 28.34 KG/M2 | HEART RATE: 88 BPM | SYSTOLIC BLOOD PRESSURE: 118 MMHG

## 2018-09-27 DIAGNOSIS — M51.26 HERNIATION OF INTERVERTEBRAL DISC OF LUMBAR REGION: ICD-10-CM

## 2018-09-27 DIAGNOSIS — M54.16 LUMBAR RADICULOPATHY: ICD-10-CM

## 2018-09-27 DIAGNOSIS — M62.830 LUMBAR PARASPINAL MUSCLE SPASM: ICD-10-CM

## 2018-09-27 DIAGNOSIS — M51.26 LUMBAR DISC HERNIATION: ICD-10-CM

## 2018-09-27 DIAGNOSIS — M54.42 CHRONIC LEFT-SIDED LOW BACK PAIN WITH LEFT-SIDED SCIATICA: Primary | ICD-10-CM

## 2018-09-27 DIAGNOSIS — G89.29 CHRONIC LEFT-SIDED LOW BACK PAIN WITH LEFT-SIDED SCIATICA: Primary | ICD-10-CM

## 2018-09-27 PROCEDURE — 99214 OFFICE O/P EST MOD 30 MIN: CPT | Performed by: NURSE PRACTITIONER

## 2018-09-27 NOTE — PROGRESS NOTES
Assessment:  1  Chronic left-sided low back pain with left-sided sciatica    2  Lumbar disc herniation - Left    3  Lumbar paraspinal muscle spasm    4  Herniation of intervertebral disc of lumbar region    5  Lumbar radiculopathy        Plan:  While the patient was in the office today, I discussed with the patient that unfortunately the radicular symptoms and sometimes the numbness is 1 of the last things to fully improve as a result imaged injections and may take a few weeks, if it will fully improve  At this point because she is noting at least moderate improvement in all of her symptoms, I do feel would be in her best interest to continue to address the inflammatory component and proceed with a repeat left L5 transforaminal epidural steroid injection with Dr Bertrand Argue  The patient was agreeable and verbalized an understanding  Complete risks and benefits including bleeding, infection, tissue reaction, nerve injury and allergic reaction were discussed  The approach was demonstrated using models and literature was provided  Verbal and written consent was obtained  I discussed with the patient that we will see how she does as a result of the 3rd injection and hopefully she will see continued and at least moderate to significant improvement in the left lower extremity radicular symptoms and numbness, if not, we may need to consider either neuropathic medications or surgical re-evaluation in the future  I encouraged the patient continue with her physical therapy and home exercise program   The patient was agreeable and verbalized an understanding  I discussed with the patient that at this point time she can followup with our office on an as-needed basis  I did review the patient that if her pain symptoms should change, worsen, and/or if she would experience any new symptoms as she would like to be evaluated for, she should give our office a call  The patient was agreeable and verbalized an understanding  History of Present Illness: The patient is a 46 y o  female last seen on 9/13/18 who presents for a follow up office visit in regards to chronic pain secondary to a herniated lumbar disc with radiculopathy  The patient currently reports that she does feel there has been at least 50-60% improvement in her left-sided low back and left lower extremity radicular symptoms as a result of her repeat left L5 transforaminal epidural steroid injection on September 13, 2018 with Dr Mika Rivera  However, she does continue with constant left leg numbness, which has also slightly improved, but is worried about the numbness and want to discuss her options at this point  She does feel that because the injection she is now starting to get somewhere with the physical therapy and home exercise program     I have personally reviewed and/or updated the patient's past medical history, past surgical history, family history, social history, current medications, allergies, and vital signs today  Review of Systems:    Review of Systems   Respiratory: Negative for shortness of breath  Cardiovascular: Negative for chest pain  Gastrointestinal: Negative for constipation, diarrhea, nausea and vomiting  Musculoskeletal: Negative for arthralgias, gait problem, joint swelling and myalgias  Skin: Negative for rash  Neurological: Negative for dizziness, seizures and weakness  All other systems reviewed and are negative  Past Medical History:   Diagnosis Date    Chronic back pain        Past Surgical History:   Procedure Laterality Date    SINUS SURGERY         Family History   Problem Relation Age of Onset    Arthritis Mother     Liver cancer Father     Parkinsonism Father        Social History     Occupational History    Not on file  Social History Main Topics    Smoking status: Never Smoker    Smokeless tobacco: Never Used    Alcohol use Yes      Comment: rare    Drug use:  No  Sexual activity: Not on file       No current outpatient prescriptions on file  Allergies   Allergen Reactions    Penicillins Rash       Physical Exam:    There were no vitals taken for this visit  Constitutional:normal, well developed, well nourished, alert, in no distress and non-toxic and no overt pain behavior  Eyes:anicteric  HEENT:grossly intact  Neck:supple, symmetric, trachea midline and no masses   Pulmonary:even and unlabored  Cardiovascular:No edema or pitting edema present  Skin:Normal without rashes or lesions and well hydrated  Psychiatric:Mood and affect appropriate  Neurologic:Cranial Nerves II-XII grossly intact  Musculoskeletal:Slightly antalgic, but steady gait without the use of any assistive devices  Imaging  No orders to display         No orders of the defined types were placed in this encounter

## 2018-09-27 NOTE — H&P
Assessment:  1  Chronic left-sided low back pain with left-sided sciatica    2  Lumbar disc herniation - Left    3  Lumbar paraspinal muscle spasm    4  Herniation of intervertebral disc of lumbar region    5  Lumbar radiculopathy        Plan:  While the patient was in the office today, I discussed with the patient that unfortunately the radicular symptoms and sometimes the numbness is 1 of the last things to fully improve as a result imaged injections and may take a few weeks, if it will fully improve  At this point because she is noting at least moderate improvement in all of her symptoms, I do feel would be in her best interest to continue to address the inflammatory component and proceed with a repeat left L5 transforaminal epidural steroid injection with Dr Eleni Loving  The patient was agreeable and verbalized an understanding  Complete risks and benefits including bleeding, infection, tissue reaction, nerve injury and allergic reaction were discussed  The approach was demonstrated using models and literature was provided  Verbal and written consent was obtained  I discussed with the patient that we will see how she does as a result of the 3rd injection and hopefully she will see continued and at least moderate to significant improvement in the left lower extremity radicular symptoms and numbness, if not, we may need to consider either neuropathic medications or surgical re-evaluation in the future  I encouraged the patient continue with her physical therapy and home exercise program   The patient was agreeable and verbalized an understanding  I discussed with the patient that at this point time she can followup with our office on an as-needed basis  I did review the patient that if her pain symptoms should change, worsen, and/or if she would experience any new symptoms as she would like to be evaluated for, she should give our office a call  The patient was agreeable and verbalized an understanding  History of Present Illness: The patient is a 46 y o  female last seen on 9/13/18 who presents for a follow up office visit in regards to chronic pain secondary to a herniated lumbar disc with radiculopathy  The patient currently reports that she does feel there has been at least 50-60% improvement in her left-sided low back and left lower extremity radicular symptoms as a result of her repeat left L5 transforaminal epidural steroid injection on September 13, 2018 with Dr Mika Rivera  However, she does continue with constant left leg numbness, which has also slightly improved, but is worried about the numbness and want to discuss her options at this point  She does feel that because the injection she is now starting to get somewhere with the physical therapy and home exercise program     I have personally reviewed and/or updated the patient's past medical history, past surgical history, family history, social history, current medications, allergies, and vital signs today  Review of Systems:    Review of Systems   Respiratory: Negative for shortness of breath  Cardiovascular: Negative for chest pain  Gastrointestinal: Negative for constipation, diarrhea, nausea and vomiting  Musculoskeletal: Negative for arthralgias, gait problem, joint swelling and myalgias  Skin: Negative for rash  Neurological: Negative for dizziness, seizures and weakness  All other systems reviewed and are negative  Past Medical History:   Diagnosis Date    Chronic back pain        Past Surgical History:   Procedure Laterality Date    SINUS SURGERY         Family History   Problem Relation Age of Onset    Arthritis Mother     Liver cancer Father     Parkinsonism Father        Social History     Occupational History    Not on file  Social History Main Topics    Smoking status: Never Smoker    Smokeless tobacco: Never Used    Alcohol use Yes      Comment: rare    Drug use:  No  Sexual activity: Not on file       No current outpatient prescriptions on file  Allergies   Allergen Reactions    Penicillins Rash       Physical Exam:    There were no vitals taken for this visit  Constitutional:normal, well developed, well nourished, alert, in no distress and non-toxic and no overt pain behavior  Eyes:anicteric  HEENT:grossly intact  Neck:supple, symmetric, trachea midline and no masses   Pulmonary:even and unlabored  Cardiovascular:No edema or pitting edema present  Skin:Normal without rashes or lesions and well hydrated  Psychiatric:Mood and affect appropriate  Neurologic:Cranial Nerves II-XII grossly intact  Musculoskeletal:Slightly antalgic, but steady gait without the use of any assistive devices  Imaging  No orders to display         No orders of the defined types were placed in this encounter

## 2018-10-18 ENCOUNTER — HOSPITAL ENCOUNTER (OUTPATIENT)
Dept: RADIOLOGY | Facility: CLINIC | Age: 53
Discharge: HOME | End: 2018-10-18
Attending: ANESTHESIOLOGY | Admitting: ANESTHESIOLOGY
Payer: COMMERCIAL

## 2018-10-18 VITALS
OXYGEN SATURATION: 98 % | DIASTOLIC BLOOD PRESSURE: 70 MMHG | SYSTOLIC BLOOD PRESSURE: 130 MMHG | HEART RATE: 72 BPM | RESPIRATION RATE: 18 BRPM | TEMPERATURE: 97.5 F

## 2018-10-18 DIAGNOSIS — M51.26 LUMBAR DISC HERNIATION: ICD-10-CM

## 2018-10-18 DIAGNOSIS — M54.16 LUMBAR RADICULOPATHY: ICD-10-CM

## 2018-10-18 DIAGNOSIS — M54.42 CHRONIC LEFT-SIDED LOW BACK PAIN WITH LEFT-SIDED SCIATICA: ICD-10-CM

## 2018-10-18 DIAGNOSIS — G89.29 CHRONIC LEFT-SIDED LOW BACK PAIN WITH LEFT-SIDED SCIATICA: ICD-10-CM

## 2018-10-18 DIAGNOSIS — M51.26 HERNIATION OF INTERVERTEBRAL DISC OF LUMBAR REGION: ICD-10-CM

## 2018-10-18 PROCEDURE — 64483 NJX AA&/STRD TFRM EPI L/S 1: CPT | Performed by: ANESTHESIOLOGY

## 2018-10-18 RX ORDER — LIDOCAINE HYDROCHLORIDE 10 MG/ML
5 INJECTION, SOLUTION EPIDURAL; INFILTRATION; INTRACAUDAL; PERINEURAL ONCE
Status: COMPLETED | OUTPATIENT
Start: 2018-10-18 | End: 2018-10-18

## 2018-10-18 RX ORDER — METHYLPREDNISOLONE ACETATE 80 MG/ML
80 INJECTION, SUSPENSION INTRA-ARTICULAR; INTRALESIONAL; INTRAMUSCULAR; PARENTERAL; SOFT TISSUE ONCE
Status: COMPLETED | OUTPATIENT
Start: 2018-10-18 | End: 2018-10-18

## 2018-10-18 RX ADMIN — LIDOCAINE HYDROCHLORIDE 3 ML: 10 INJECTION, SOLUTION EPIDURAL; INFILTRATION; INTRACAUDAL; PERINEURAL at 15:33

## 2018-10-18 RX ADMIN — IOHEXOL 1 ML: 300 INJECTION, SOLUTION INTRAVENOUS at 15:34

## 2018-10-18 RX ADMIN — METHYLPREDNISOLONE ACETATE 80 MG: 80 INJECTION, SUSPENSION INTRA-ARTICULAR; INTRALESIONAL; INTRAMUSCULAR; SOFT TISSUE at 15:32

## 2018-10-18 RX ADMIN — LIDOCAINE HYDROCHLORIDE 1 ML: 20 INJECTION, SOLUTION EPIDURAL; INFILTRATION; INTRACAUDAL at 15:35

## 2018-10-18 NOTE — DISCHARGE INSTRUCTIONS
Epidural Steroid Injection   WHAT YOU NEED TO KNOW:   An epidural steroid injection (AYDEN) is a procedure to inject steroid medicine into the epidural space  The epidural space is between your spinal cord and vertebrae  Steroids reduce inflammation and fluid buildup in your spine that may be causing pain  You may be given pain medicine along with the steroids  ACTIVITY  · Do not drive or operate machinery today  · No strenuous activity today - bending, lifting, etc   · You may resume normal activites starting tomorrow - start slowly and as tolerated  · You may shower today, but no tub baths or hot tubs  · You may have numbness for several hours from the local anesthetic  Please use caution and common sense, especially with weight-bearing activities  CARE OF THE INJECTION SITE  · If you have soreness or pain, apply ice to the area today (20 minutes on/20 minutes off)  · Starting tomorrow, you may use warm, moist heat or ice if needed  · You may have an increase or change in your discomfort for 36-48 hours after your treatment  · Apply ice and continue with any pain medication you have been prescribed  · Notify [facility] if you have any of the following: redness, drainage, swelling, headache, stiff neck or fever above 100°F     SPECIAL INSTRUCTIONS  · Our office will contact you in approximately 7 days for a progress report  MEDICATIONS  · Continue to take all routine medications  · Our office may have instructed you to hold some medications  If you have a problem specifically related to your procedure, please call our office at [phone]  Problems not related to your procedure should be directed to your primary care physician

## 2018-10-18 NOTE — H&P
History of Present Illness: The patient is a 46 y o  female who presents with complaints of low back and leg pain  Patient Active Problem List   Diagnosis    Lumbar paraspinal muscle spasm    SI (sacroiliac) joint dysfunction    Lumbar radiculopathy    Herniation of intervertebral disc of lumbar region    Lumbar disc herniation - Left    Chronic left-sided low back pain with left-sided sciatica       Past Medical History:   Diagnosis Date    Chronic back pain        Past Surgical History:   Procedure Laterality Date    SINUS SURGERY         No current outpatient prescriptions on file  Allergies   Allergen Reactions    Penicillins Rash       Physical Exam:   General: Awake, Alert, Oriented x 3, Mood and affect appropriate  Respiratory: Respirations even and unlabored  Cardiovascular: Peripheral pulses intact; no edema  Musculoskeletal Exam:   Pain with lumbar extension    ASA Score: II         Assessment:   1  Chronic left-sided low back pain with left-sided sciatica    2  Lumbar disc herniation - Left    3  Herniation of intervertebral disc of lumbar region    4   Lumbar radiculopathy        Plan: Left L5 TFESI #3

## 2018-10-25 ENCOUNTER — TELEPHONE (OUTPATIENT)
Dept: PAIN MEDICINE | Facility: CLINIC | Age: 53
End: 2018-10-25

## 2018-10-25 NOTE — TELEPHONE ENCOUNTER
Telephone note   Reason for the call    Post Op F/u SL Pato Lawrence with pt she states that her pain level is an 8 and that she got about 20% relief from the inj     Advised about up coming appt     S/P Left L5 TFESI #3 on 10/18 w/SL in [place]      Follow up with DG on 11/7

## 2018-11-07 ENCOUNTER — OFFICE VISIT (OUTPATIENT)
Dept: PAIN MEDICINE | Facility: CLINIC | Age: 53
End: 2018-11-07
Payer: COMMERCIAL

## 2018-11-07 VITALS
SYSTOLIC BLOOD PRESSURE: 128 MMHG | HEIGHT: 68 IN | DIASTOLIC BLOOD PRESSURE: 78 MMHG | HEART RATE: 68 BPM | WEIGHT: 187 LBS | BODY MASS INDEX: 28.34 KG/M2

## 2018-11-07 DIAGNOSIS — G89.29 CHRONIC LEFT-SIDED LOW BACK PAIN WITH LEFT-SIDED SCIATICA: Primary | ICD-10-CM

## 2018-11-07 DIAGNOSIS — M54.16 LUMBAR RADICULOPATHY: ICD-10-CM

## 2018-11-07 DIAGNOSIS — M54.42 CHRONIC LEFT-SIDED LOW BACK PAIN WITH LEFT-SIDED SCIATICA: Primary | ICD-10-CM

## 2018-11-07 DIAGNOSIS — M51.26 LUMBAR DISC HERNIATION: ICD-10-CM

## 2018-11-07 DIAGNOSIS — M62.830 LUMBAR PARASPINAL MUSCLE SPASM: ICD-10-CM

## 2018-11-07 PROCEDURE — 99214 OFFICE O/P EST MOD 30 MIN: CPT | Performed by: NURSE PRACTITIONER

## 2018-11-07 RX ORDER — DULOXETIN HYDROCHLORIDE 20 MG/1
CAPSULE, DELAYED RELEASE ORAL
Qty: 30 CAPSULE | Refills: 0 | Status: SHIPPED | OUTPATIENT
Start: 2018-11-07 | End: 2018-12-05 | Stop reason: SDUPTHER

## 2018-11-07 NOTE — PATIENT INSTRUCTIONS
Call with any side effects or issues from the Cymbalta (Duloxetine)  Call at least 4-5 days before out of Cymbalta to let us know how you are doing and if you want to increase it and so we can send a refill

## 2018-11-07 NOTE — PROGRESS NOTES
Assessment:  1  Chronic left-sided low back pain with left-sided sciatica    2  Lumbar radiculopathy    3  Lumbar disc herniation - Left    4  Lumbar paraspinal muscle spasm        Plan:  While the patient was in the office today, I discussed with the patient at this point time since she is noting significant and stable relief and she has had 3 epidural steroid injections, for now, we would try to hold off any repeat injections for at least 2-3 months, if not longer  The patient was agreeable and verbalized an understanding  I explained to the patient at this point since she is not interested in surgical options, we could look at medication options as I feel there is definitely significant neuropathic, myofascial, and osteoarthritic component to her pain symptoms and she may benefit from a medications such as Cymbalta  The patient denied being prescribed any anti-depressant and/or psychiatric medications  I reviewed with the patient that it may take 3-4 weeks for the medication's effects to be noticed and that it should never be abruptly stopped  Possible side effects include but are not limited to; vertigo, lethargy, nausea, worsening depression/anxiety, and confusion  I discussed with the patient that because she seemed sensitive to medications we will start her on a very low and subtherapeutic dose of 20 mg for the next 2 months and see how she does  I advised the patient to call our office if they experience any side effects  I explained to the patient that I did not put a refill on the Cymbalta as I would like her to call 4-5 days before she is out of medication at that point we may further titrate the Cymbalta to 30 mg a day before her next office visit in 2 months  The patient verbalized an understanding  The patient will follow-up in 8 weeks for medication prescription refill and reevaluation  The patient was advised to contact the office should their symptoms worsen in the interim   The patient was agreeable and verbalized an understanding  I attest that I have spent at least 25 minutes face to face with the patient and that at least 50% of the time was educating and/or discussing the patient's symptoms and treatment plan options until the patient was satisfied with the plan  History of Present Illness: The patient is a 46 y o  female last seen on 10/18/18 who presents for a follow up office visit in regards to chronic pain secondary to lumbar disc herniation with radiculopathy  The patient currently reports that since her last office visit she does note overall improvement as she is status post her 3rd left L5 transforaminal epidural steroid injection on October 18, 2018 with Dr Jyothi Day and feels there has been at least an 80% improvement since before the injections to now, however, she is entering the busy season as she is a   The patient reports that at this point she is not overly ready to consider surgical options and wants to know what else can be done to try to help her manage the pain on a more consistent and tolerable basis  However, the patient reports that in general she tries not to take any kind of medications, even over-the-counter medications as she is somewhat sensitive to medications in general     I have personally reviewed and/or updated the patient's past medical history, past surgical history, family history, social history, current medications, allergies, and vital signs today  Review of Systems:    Review of Systems   Respiratory: Negative for shortness of breath  Cardiovascular: Negative for chest pain  Gastrointestinal: Negative for constipation, diarrhea, nausea and vomiting  Musculoskeletal: Negative for arthralgias, gait problem, joint swelling and myalgias  Skin: Negative for rash  Neurological: Negative for dizziness, seizures and weakness  All other systems reviewed and are negative          Past Medical History:   Diagnosis Date    Chronic back pain        Past Surgical History:   Procedure Laterality Date    SINUS SURGERY         Family History   Problem Relation Age of Onset    Arthritis Mother     Liver cancer Father     Parkinsonism Father        Social History     Occupational History    Not on file  Social History Main Topics    Smoking status: Never Smoker    Smokeless tobacco: Never Used    Alcohol use Yes      Comment: rare    Drug use: No    Sexual activity: Not on file       No current outpatient prescriptions on file  Allergies   Allergen Reactions    Penicillins Rash       Physical Exam:    There were no vitals taken for this visit  Constitutional:normal, well developed, well nourished, alert, in no distress and non-toxic and no overt pain behavior  Eyes:anicteric  HEENT:grossly intact  Neck:supple, symmetric, trachea midline and no masses   Pulmonary:even and unlabored  Cardiovascular:No edema or pitting edema present  Skin:Normal without rashes or lesions and well hydrated  Psychiatric:Mood and affect appropriate  Neurologic:Cranial Nerves II-XII grossly intact  Musculoskeletal:Slightly antalgic, but steady gait without the use of any assistive devices  Imaging  No orders to display         No orders of the defined types were placed in this encounter

## 2018-12-05 ENCOUNTER — TELEPHONE (OUTPATIENT)
Dept: OBGYN CLINIC | Facility: HOSPITAL | Age: 53
End: 2018-12-05

## 2018-12-05 DIAGNOSIS — M51.26 LUMBAR DISC HERNIATION: ICD-10-CM

## 2018-12-05 DIAGNOSIS — G89.29 CHRONIC LEFT-SIDED LOW BACK PAIN WITH LEFT-SIDED SCIATICA: ICD-10-CM

## 2018-12-05 DIAGNOSIS — M54.16 LUMBAR RADICULOPATHY: ICD-10-CM

## 2018-12-05 DIAGNOSIS — M54.42 CHRONIC LEFT-SIDED LOW BACK PAIN WITH LEFT-SIDED SCIATICA: ICD-10-CM

## 2018-12-05 RX ORDER — DULOXETIN HYDROCHLORIDE 30 MG/1
CAPSULE, DELAYED RELEASE ORAL
Qty: 30 CAPSULE | Refills: 0 | Status: SHIPPED | OUTPATIENT
Start: 2018-12-05 | End: 2018-12-18 | Stop reason: SDUPTHER

## 2018-12-05 NOTE — TELEPHONE ENCOUNTER
Patient is calling to let the doctor know that the side effects from the cymbalta are minimal and there is some dizziness but nothing concerning and it is diminishing over time  Patient has about 5 days left and says that it has helped her back      Mralo Estes pt

## 2018-12-05 NOTE — TELEPHONE ENCOUNTER
At this point she should finish out the Cymbalta 20 mg dose and then I e-scribed a script for 30 mg to her pharmacy, 1 PO HS  She should start that and take that until her f/u OV  She is to see how if affects her before she drives or operates machinery and if she has any issues with it, she should call our office

## 2018-12-05 NOTE — TELEPHONE ENCOUNTER
Pt calling w/ an update on cymbalta per 11/7 ov note w/ DG  Poss increase to 30 mg qd  Next ov on 1/4/2019  Please advise

## 2018-12-17 ENCOUNTER — TELEPHONE (OUTPATIENT)
Dept: PAIN MEDICINE | Facility: MEDICAL CENTER | Age: 53
End: 2018-12-17

## 2018-12-17 DIAGNOSIS — M54.16 LUMBAR RADICULOPATHY: ICD-10-CM

## 2018-12-17 DIAGNOSIS — M51.26 LUMBAR DISC HERNIATION: ICD-10-CM

## 2018-12-17 DIAGNOSIS — G89.29 CHRONIC LEFT-SIDED LOW BACK PAIN WITH LEFT-SIDED SCIATICA: ICD-10-CM

## 2018-12-17 DIAGNOSIS — M54.42 CHRONIC LEFT-SIDED LOW BACK PAIN WITH LEFT-SIDED SCIATICA: ICD-10-CM

## 2018-12-17 NOTE — TELEPHONE ENCOUNTER
Pt's  called stating that they received a message to call the office to r/s her appt with you  She is currently scheduled for 1/4, and you will be in the [place] office  This is for a med refill and you do not have any available appts open until Feb 4th  Please advise if you will call in refill for patient's meds to get her through to your next available appt or if you are able to schedule her sooner  Please contact pt's , Lia Cespedes, regarding this matter  He can be reached at [phone]

## 2018-12-18 RX ORDER — DULOXETIN HYDROCHLORIDE 30 MG/1
CAPSULE, DELAYED RELEASE ORAL
Qty: 30 CAPSULE | Refills: 1 | Status: SHIPPED | OUTPATIENT
Start: 2018-12-18 | End: 2019-02-18 | Stop reason: SDUPTHER

## 2018-12-18 NOTE — TELEPHONE ENCOUNTER
S/w pt, confirmed cymbalta 30 mg w/ + relief, no se's  Rescheduled ov for 2/4 at 1345  Per pt, please discuss appt time with her   Pt could not rcall a number that her  could be reached at now  Advised pt, please cb if 2/4 at [phone] is a problem / need to be rescheduled  Left a detailed message advising of above at [phone]  Provided cb number and office hours

## 2018-12-18 NOTE — TELEPHONE ENCOUNTER
I e-scribed a 30 day script with a refill  I am requesting off on 2/4/19 so she will need to be moved  Sorry

## 2018-12-19 NOTE — TELEPHONE ENCOUNTER
S/w pt, advised of above  Advised pt, will have someone from the  contact her  at [phone] to reschedule her 2/4 ov  Pt verbalized understandign and appreciation

## 2019-02-18 ENCOUNTER — OFFICE VISIT (OUTPATIENT)
Dept: PAIN MEDICINE | Facility: CLINIC | Age: 54
End: 2019-02-18
Payer: COMMERCIAL

## 2019-02-18 VITALS
HEIGHT: 68 IN | HEART RATE: 72 BPM | SYSTOLIC BLOOD PRESSURE: 132 MMHG | BODY MASS INDEX: 28.34 KG/M2 | DIASTOLIC BLOOD PRESSURE: 72 MMHG | WEIGHT: 187 LBS

## 2019-02-18 DIAGNOSIS — M62.830 LUMBAR PARASPINAL MUSCLE SPASM: ICD-10-CM

## 2019-02-18 DIAGNOSIS — G89.29 CHRONIC LEFT-SIDED LOW BACK PAIN WITH LEFT-SIDED SCIATICA: Primary | ICD-10-CM

## 2019-02-18 DIAGNOSIS — M51.26 HERNIATION OF INTERVERTEBRAL DISC OF LUMBAR REGION: ICD-10-CM

## 2019-02-18 DIAGNOSIS — M51.26 LUMBAR DISC HERNIATION: ICD-10-CM

## 2019-02-18 DIAGNOSIS — M54.42 CHRONIC LEFT-SIDED LOW BACK PAIN WITH LEFT-SIDED SCIATICA: Primary | ICD-10-CM

## 2019-02-18 DIAGNOSIS — M54.16 LUMBAR RADICULOPATHY: ICD-10-CM

## 2019-02-18 PROCEDURE — 99213 OFFICE O/P EST LOW 20 MIN: CPT | Performed by: NURSE PRACTITIONER

## 2019-02-18 RX ORDER — DULOXETIN HYDROCHLORIDE 30 MG/1
CAPSULE, DELAYED RELEASE ORAL
Qty: 30 CAPSULE | Refills: 3 | Status: SHIPPED | OUTPATIENT
Start: 2019-02-18 | End: 2019-06-05 | Stop reason: SDUPTHER

## 2019-02-18 NOTE — PROGRESS NOTES
Assessment:  1  Chronic left-sided low back pain with left-sided sciatica    2  Lumbar radiculopathy    3  Herniation of intervertebral disc of lumbar region    4  Lumbar disc herniation - Left    5  Lumbar paraspinal muscle spasm        Plan:  While the patient was in the office today, I discussed with the patient that since she is noting significant and stable relief on a subtherapeutic dose of Cymbalta, for now, I feel would be in her best interest to continue the Cymbalta at 30 mg a day as prescribed  However, if between now and her next office visit her pain symptoms should start to return even more than they have and she would like to consider increasing the Cymbalta to 40 mg before her next office visit, she should call our office and we can change her prescription  The patient was agreeable and verbalized an understanding  I advised the patient to increase her activity as tolerated and continue with home exercise program, allowing pain be her guide  The patient was agreeable and verbalized an understanding  The patient will follow-up in 4 months for medication prescription refill and reevaluation  The patient was advised to contact the office should their symptoms worsen in the interim  The patient was agreeable and verbalized an understanding  History of Present Illness: The patient is a 46 y o  female last seen on 11/7/18 who presents for a follow up office visit in regards to chronic pain secondary to herniated lumbar disc with radiculopathy  The patient currently reports that since her last office visit there has definitely been significant improvement in her overall pain symptoms as she did start the Cymbalta as discussed and is currently taking 30 mg daily with significant improvement and relief of her pain  She reports that for the most part she is able to work and function in her normal daily life with minimal and manageable pain   She reports she has noticed that over the past week or 2 some of the numbness has started to return in her foot and ankle, but is still not at the level was prior to the Cymbalta and would like to just continue the Cymbalta as prescribed  Current pain medications includes:  Cymbalta 30 mg daily  The patient reports that this regimen is providing 75% pain relief  The patient is reporting no side effects from this pain medication regimen  I have personally reviewed and/or updated the patient's past medical history, past surgical history, family history, social history, current medications, allergies, and vital signs today  Review of Systems:    Review of Systems   Respiratory: Negative for shortness of breath  Cardiovascular: Negative for chest pain  Gastrointestinal: Negative for constipation, diarrhea, nausea and vomiting  Musculoskeletal: Negative for arthralgias, gait problem, joint swelling and myalgias  Skin: Negative for rash  Neurological: Positive for weakness  Negative for dizziness and seizures  All other systems reviewed and are negative  Past Medical History:   Diagnosis Date    Chronic back pain        Past Surgical History:   Procedure Laterality Date    SINUS SURGERY         Family History   Problem Relation Age of Onset    Arthritis Mother     Liver cancer Father     Parkinsonism Father        Social History     Occupational History    Not on file   Tobacco Use    Smoking status: Never Smoker    Smokeless tobacco: Never Used   Substance and Sexual Activity    Alcohol use: Yes     Comment: rare    Drug use: No    Sexual activity: Not on file         Current Outpatient Medications:     DULoxetine (CYMBALTA) 30 mg delayed release capsule, Take 1 PO HS , Disp: 30 capsule, Rfl: 1    Allergies   Allergen Reactions    Penicillins Rash       Physical Exam:    There were no vitals taken for this visit      Constitutional:normal, well developed, well nourished, alert, in no distress and non-toxic and no overt pain behavior  Eyes:anicteric  HEENT:grossly intact  Neck:supple, symmetric, trachea midline and no masses   Pulmonary:even and unlabored  Cardiovascular:No edema or pitting edema present  Skin:Normal without rashes or lesions and well hydrated  Psychiatric:Mood and affect appropriate  Neurologic:Cranial Nerves II-XII grossly intact  Musculoskeletal:normal      Imaging  No orders to display         No orders of the defined types were placed in this encounter

## 2019-06-05 ENCOUNTER — OFFICE VISIT (OUTPATIENT)
Dept: PAIN MEDICINE | Facility: CLINIC | Age: 54
End: 2019-06-05
Payer: COMMERCIAL

## 2019-06-05 VITALS
BODY MASS INDEX: 28.79 KG/M2 | HEIGHT: 68 IN | DIASTOLIC BLOOD PRESSURE: 80 MMHG | WEIGHT: 190 LBS | SYSTOLIC BLOOD PRESSURE: 130 MMHG | HEART RATE: 78 BPM

## 2019-06-05 DIAGNOSIS — M51.26 LUMBAR DISC HERNIATION: ICD-10-CM

## 2019-06-05 DIAGNOSIS — G89.4 CHRONIC PAIN SYNDROME: Primary | ICD-10-CM

## 2019-06-05 DIAGNOSIS — M51.26 HERNIATION OF INTERVERTEBRAL DISC OF LUMBAR REGION: ICD-10-CM

## 2019-06-05 DIAGNOSIS — G89.29 CHRONIC LEFT-SIDED LOW BACK PAIN WITH LEFT-SIDED SCIATICA: ICD-10-CM

## 2019-06-05 DIAGNOSIS — M54.16 LUMBAR RADICULOPATHY: ICD-10-CM

## 2019-06-05 DIAGNOSIS — M54.42 CHRONIC LEFT-SIDED LOW BACK PAIN WITH LEFT-SIDED SCIATICA: ICD-10-CM

## 2019-06-05 DIAGNOSIS — M62.830 LUMBAR PARASPINAL MUSCLE SPASM: ICD-10-CM

## 2019-06-05 PROCEDURE — 99213 OFFICE O/P EST LOW 20 MIN: CPT | Performed by: NURSE PRACTITIONER

## 2019-06-05 RX ORDER — DULOXETIN HYDROCHLORIDE 30 MG/1
CAPSULE, DELAYED RELEASE ORAL
Qty: 30 CAPSULE | Refills: 5 | Status: SHIPPED | OUTPATIENT
Start: 2019-06-05 | End: 2019-12-30 | Stop reason: SDUPTHER

## 2019-07-08 ENCOUNTER — TELEPHONE (OUTPATIENT)
Dept: OBGYN CLINIC | Facility: HOSPITAL | Age: 54
End: 2019-07-08

## 2019-07-08 NOTE — TELEPHONE ENCOUNTER
Caller: Bubba Julian,   Call back number: [phone]  Patient's doctor: Dr Tanvi Ely is asking for a note stating that the patient was told to stop PT when she was referred to Dr Shamar Ortiz due to it giving her increasing pain  Also, he is asking for something stating that Dr Shamar Ortiz requires to have all testing, such as an MRI, done before he sees that patient due to him being a spinal surgeon

## 2019-07-08 NOTE — TELEPHONE ENCOUNTER
Caller: Sarah Moura,   Call back number: [phone]  Patient's doctor: Dr Kirby [facility]    Patient's , Sarah Moura, is asking to speak to you regarding a note made in the patient's chart from visits on 7/9/18  He states that it is noted that she is obese and uses a cane  He states the patient is only considered overweight and has never used a cane  He is asking to speak to you regarding this

## 2019-07-08 NOTE — TELEPHONE ENCOUNTER
Discussed with the patient regarding error in the medical record from 7/18   These were corrected    Thanks,  GS

## 2019-07-11 NOTE — TELEPHONE ENCOUNTER
Manuela Kothari stated that Dr Earnest Cordoba was the doctor that ordered patient to take physical therapy & he was also the doctor that advised the patient to discontinue physical therapy when it was not helping

## 2019-07-11 NOTE — TELEPHONE ENCOUNTER
Yanira Caballero is calling asking for something in writing that Dr Felipe De La Torre would not see anyone without an mri being done within the past year  Yanira Caballero is stating that he needs something to back up that this was his protocol  Yanira Caballero needs a letter from Dr Felipe De La Torre stating that due to his criteria, patient needed to have an mri dated within a year to be seen by him  Yanira Caballero needs this letter asap & wants to pick it up tomorrow  Please call Yanira Caballero when the letter is ready  Yanira Caballero also asked that I email the office note that Dr Felipe De La Torre fixed  I advised that due to hipaa violation I cannot email because my email is not secure  Yanira Caballero stated that someone had already emailed him 67 pages of records & he couldn't see how I could not email this to him  I did advise Yanira Caballero that he would have to go into the office to sign a release & if it was only one office visit summary, they may be able to print it there for him

## 2019-07-11 NOTE — TELEPHONE ENCOUNTER
I cannot write a note commenting on another physician's policy regarding MRI's  That note would have to come from that physician regarding his or her policy of seeing patients with or without MRIs  My recommendations included obtaining an MRI due to worsening back pain with persistent neurological symptoms and follow up with Dr Latanya Lopez after the MRI was completed

## 2019-07-12 NOTE — TELEPHONE ENCOUNTER
Patient's  is checking on the status of this letter    He needs to  this letter today, as he is stopping in at the [facility] office today, to  another letter around 1pm     #[phone]

## 2019-07-12 NOTE — TELEPHONE ENCOUNTER
Patients  is calling back  He says that he needs this note today   He says that he would like the doctor to call him [phone]

## 2019-07-12 NOTE — TELEPHONE ENCOUNTER
Haylee Villalobos is calling on behalf of his wife the patient Susana Darling and wants to know if you can write a letter stating that the patient was not able to schedule an appointment with you because she did not have an MRI which is your protocol in the last 12 months  I advise the  it is the last 18 months but he is insisting I stand corrected  He also said that he has an appeal with the insurance company regarding this issue  he also said that he will take a half day off on Monday if he has to come and sit in the office to get this information  Please advise

## 2019-12-30 DIAGNOSIS — M51.26 LUMBAR DISC HERNIATION: ICD-10-CM

## 2019-12-30 DIAGNOSIS — G89.29 CHRONIC LEFT-SIDED LOW BACK PAIN WITH LEFT-SIDED SCIATICA: ICD-10-CM

## 2019-12-30 DIAGNOSIS — M54.16 LUMBAR RADICULOPATHY: ICD-10-CM

## 2019-12-30 DIAGNOSIS — M54.42 CHRONIC LEFT-SIDED LOW BACK PAIN WITH LEFT-SIDED SCIATICA: ICD-10-CM

## 2019-12-30 RX ORDER — DULOXETIN HYDROCHLORIDE 30 MG/1
CAPSULE, DELAYED RELEASE ORAL
Qty: 30 CAPSULE | Refills: 0 | Status: SHIPPED | OUTPATIENT
Start: 2019-12-30 | End: 2020-01-07 | Stop reason: SDUPTHER

## 2019-12-30 NOTE — TELEPHONE ENCOUNTER
I sent a 30 day script to the [facility] pharmacy on file  She should f/u as scheduled  Thank you

## 2019-12-30 NOTE — TELEPHONE ENCOUNTER
Pt contacted Call Center requested refill of their medication  Pts  called and said this Rx has no refills left  Medication Name:  cymbalta    Dosage of Med:  30mg    Frequency of Med:  Take 1 PO HS  Remaining Medication:  2 left     Pharmacy and Location:  [facility], [address] [place] [address]      Pt  Preferred Callback Phone Number:  [phone]    Thank you

## 2020-01-07 ENCOUNTER — OFFICE VISIT (OUTPATIENT)
Dept: PAIN MEDICINE | Facility: CLINIC | Age: 55
End: 2020-01-07
Payer: COMMERCIAL

## 2020-01-07 VITALS
BODY MASS INDEX: 28.19 KG/M2 | DIASTOLIC BLOOD PRESSURE: 82 MMHG | HEIGHT: 68 IN | HEART RATE: 72 BPM | WEIGHT: 186 LBS | SYSTOLIC BLOOD PRESSURE: 138 MMHG

## 2020-01-07 DIAGNOSIS — G89.4 CHRONIC PAIN SYNDROME: Primary | ICD-10-CM

## 2020-01-07 DIAGNOSIS — M51.26 HERNIATION OF INTERVERTEBRAL DISC OF LUMBAR REGION: ICD-10-CM

## 2020-01-07 DIAGNOSIS — M51.26 LUMBAR DISC HERNIATION: ICD-10-CM

## 2020-01-07 DIAGNOSIS — M54.42 CHRONIC LEFT-SIDED LOW BACK PAIN WITH LEFT-SIDED SCIATICA: ICD-10-CM

## 2020-01-07 DIAGNOSIS — G89.29 CHRONIC LEFT-SIDED LOW BACK PAIN WITH LEFT-SIDED SCIATICA: ICD-10-CM

## 2020-01-07 DIAGNOSIS — M54.16 LUMBAR RADICULOPATHY: ICD-10-CM

## 2020-01-07 PROCEDURE — 99214 OFFICE O/P EST MOD 30 MIN: CPT | Performed by: NURSE PRACTITIONER

## 2020-01-07 RX ORDER — DULOXETIN HYDROCHLORIDE 30 MG/1
CAPSULE, DELAYED RELEASE ORAL
Qty: 30 CAPSULE | Refills: 5 | Status: SHIPPED | OUTPATIENT
Start: 2020-01-07 | End: 2020-06-23 | Stop reason: SDUPTHER

## 2020-01-07 NOTE — PROGRESS NOTES
Assessment:  1  Chronic pain syndrome    2  Chronic left-sided low back pain with left-sided sciatica    3  Lumbar radiculopathy    4  Lumbar disc herniation - Left    5  Herniation of intervertebral disc of lumbar region        Plan:  While the patient was in the office today, I did have a thorough conversation with the patient regarding her chronic pain syndrome, medication regimen, and treatment plan  I explained the patient that at this point since she is noting significant and stable relief on a subtherapeutic dose of Cymbalta, I feel it is reasonable appropriate to continue the Cymbalta as prescribed for now  I did remind the patient that if her pain symptoms would breakthrough more consistently, since she is on such a low dose of Cymbalta we could always consider increasing the Cymbalta to at least 40 mg in the future as needed  However, for now, the patient wanted to continue with the 30 mg dosage  The patient will follow-up in 6 months for medication prescription refill and reevaluation  The patient was advised to contact the office should their symptoms worsen in the interim  The patient was agreeable and verbalized an understanding  History of Present Illness: The patient is a 53 y o  female last seen on 6/5/18 who presents for a follow up office visit in regards to chronic pain syndrome secondary to herniated lumbar disc with radiculopathy  The patient currently reports that since her last office visit overall her pain symptoms have continued to remained significantly stable and manageable with her current medication regimen and allows her to perform the duties of her job and life with minimal and manageable pain  Current pain medications includes:  Cymbalta 30 mg at bedtime  The patient reports that this regimen is providing 75% pain relief  The patient is reporting no side effects from this pain medication regimen      I have personally reviewed and/or updated the patient's past medical history, past surgical history, family history, social history, current medications, allergies, and vital signs today  Review of Systems:    Review of Systems   Respiratory: Negative for shortness of breath  Cardiovascular: Negative for chest pain  Gastrointestinal: Negative for constipation, diarrhea, nausea and vomiting  Musculoskeletal: Negative for arthralgias, gait problem, joint swelling and myalgias  Skin: Negative for rash  Neurological: Negative for dizziness, seizures and weakness  All other systems reviewed and are negative  Past Medical History:   Diagnosis Date    Chronic back pain        Past Surgical History:   Procedure Laterality Date    SINUS SURGERY         Family History   Problem Relation Age of Onset    Arthritis Mother     Liver cancer Father     Parkinsonism Father        Social History     Occupational History    Not on file   Tobacco Use    Smoking status: Never Smoker    Smokeless tobacco: Never Used   Substance and Sexual Activity    Alcohol use: Yes     Comment: rare    Drug use: No    Sexual activity: Not on file         Current Outpatient Medications:     DULoxetine (CYMBALTA) 30 mg delayed release capsule, TAKE ONE CAPSULE BY MOUTH AT BEDTIME, Disp: 30 capsule, Rfl: 5    Allergies   Allergen Reactions    Penicillins Rash       Physical Exam:    /82 (BP Location: Left arm, Patient Position: Sitting, Cuff Size: Standard)   Pulse 72   Ht 5' 8" (1 727 m)   Wt 84 4 kg (186 lb)   BMI 28 28 kg/m²     Constitutional:normal, well developed, well nourished, alert, in no distress and non-toxic and no overt pain behavior    Eyes:anicteric  HEENT:grossly intact  Neck:supple, symmetric, trachea midline and no masses   Pulmonary:even and unlabored  Cardiovascular:No edema or pitting edema present  Skin:Normal without rashes or lesions and well hydrated  Psychiatric:Mood and affect appropriate  Neurologic:Cranial Nerves II-XII grossly intact  Musculoskeletal:normal      Imaging  No orders to display         No orders of the defined types were placed in this encounter

## 2020-01-07 NOTE — LETTER
January 7, 2020     Patient: Rosario Metz   YOB: 1965   Date of Visit: 1/7/2020       To Whom it May Concern:    Rosario Metz is under my professional care  She was seen in my office on 1/7/2020  At this point the patient continues to be treated for her chronic low back and leg pain  We feel that it is important that her  vehicles are equipped with comfortable and appropriate cushioning and seating to continue to allow her to comfortably perform her daily work duties  We are asking that the cushioning in her vehicle be replaced and that will allow her to continue to do her job comfortably  If you have any questions or concerns, please don't hesitate to call           Sincerely,          ELIZABET Mishra        CC: No Recipients

## 2020-06-23 ENCOUNTER — TELEPHONE (OUTPATIENT)
Dept: PAIN MEDICINE | Facility: CLINIC | Age: 55
End: 2020-06-23

## 2020-06-23 ENCOUNTER — OFFICE VISIT (OUTPATIENT)
Dept: PAIN MEDICINE | Facility: CLINIC | Age: 55
End: 2020-06-23
Payer: COMMERCIAL

## 2020-06-23 VITALS
HEIGHT: 68 IN | HEART RATE: 78 BPM | WEIGHT: 188 LBS | TEMPERATURE: 98.1 F | DIASTOLIC BLOOD PRESSURE: 88 MMHG | BODY MASS INDEX: 28.49 KG/M2 | SYSTOLIC BLOOD PRESSURE: 142 MMHG

## 2020-06-23 DIAGNOSIS — M51.26 LUMBAR DISC HERNIATION: ICD-10-CM

## 2020-06-23 DIAGNOSIS — G89.29 CHRONIC LEFT-SIDED LOW BACK PAIN WITH LEFT-SIDED SCIATICA: ICD-10-CM

## 2020-06-23 DIAGNOSIS — M62.830 LUMBAR PARASPINAL MUSCLE SPASM: ICD-10-CM

## 2020-06-23 DIAGNOSIS — M54.42 CHRONIC LEFT-SIDED LOW BACK PAIN WITH LEFT-SIDED SCIATICA: ICD-10-CM

## 2020-06-23 DIAGNOSIS — M51.26 HERNIATION OF INTERVERTEBRAL DISC OF LUMBAR REGION: ICD-10-CM

## 2020-06-23 DIAGNOSIS — M53.3 SI (SACROILIAC) JOINT DYSFUNCTION: ICD-10-CM

## 2020-06-23 DIAGNOSIS — G89.4 CHRONIC PAIN SYNDROME: Primary | ICD-10-CM

## 2020-06-23 DIAGNOSIS — M54.16 LUMBAR RADICULOPATHY: ICD-10-CM

## 2020-06-23 PROCEDURE — 99214 OFFICE O/P EST MOD 30 MIN: CPT | Performed by: NURSE PRACTITIONER

## 2020-06-23 RX ORDER — DULOXETIN HYDROCHLORIDE 30 MG/1
CAPSULE, DELAYED RELEASE ORAL
Qty: 30 CAPSULE | Refills: 5 | Status: SHIPPED | OUTPATIENT
Start: 2020-06-23 | End: 2020-07-27 | Stop reason: SDUPTHER

## 2020-07-25 DIAGNOSIS — M51.26 LUMBAR DISC HERNIATION: ICD-10-CM

## 2020-07-25 DIAGNOSIS — M54.42 CHRONIC LEFT-SIDED LOW BACK PAIN WITH LEFT-SIDED SCIATICA: ICD-10-CM

## 2020-07-25 DIAGNOSIS — M54.16 LUMBAR RADICULOPATHY: ICD-10-CM

## 2020-07-25 DIAGNOSIS — G89.29 CHRONIC LEFT-SIDED LOW BACK PAIN WITH LEFT-SIDED SCIATICA: ICD-10-CM

## 2020-07-27 DIAGNOSIS — M54.42 CHRONIC LEFT-SIDED LOW BACK PAIN WITH LEFT-SIDED SCIATICA: ICD-10-CM

## 2020-07-27 DIAGNOSIS — M54.16 LUMBAR RADICULOPATHY: ICD-10-CM

## 2020-07-27 DIAGNOSIS — G89.29 CHRONIC LEFT-SIDED LOW BACK PAIN WITH LEFT-SIDED SCIATICA: ICD-10-CM

## 2020-07-27 DIAGNOSIS — M51.26 LUMBAR DISC HERNIATION: ICD-10-CM

## 2020-07-27 RX ORDER — DULOXETIN HYDROCHLORIDE 30 MG/1
CAPSULE, DELAYED RELEASE ORAL
Qty: 90 CAPSULE | Refills: 0 | OUTPATIENT
Start: 2020-07-27

## 2020-07-27 RX ORDER — DULOXETIN HYDROCHLORIDE 30 MG/1
CAPSULE, DELAYED RELEASE ORAL
Qty: 30 CAPSULE | Refills: 5 | Status: SHIPPED | OUTPATIENT
Start: 2020-07-27 | End: 2021-01-06 | Stop reason: SDUPTHER

## 2020-07-27 NOTE — TELEPHONE ENCOUNTER
S/w pharmacist at [facility], states pt does not have any refills at this time because script was transferred from  [facility]  Most recent office visit 6/23, last prescribed 6/23  Next appt 1/6/2021  Please advise, thank you

## 2020-07-27 NOTE — TELEPHONE ENCOUNTER
Pt contacted Call Center requested refill of their medication  Medication Name:  Duloxetine     Dosage of Med:  30 mg    Frequency of Med:  1 x a day  Remaining Medication:  1 left    Pharmacy and Location:  [facility]    Pt  Preferred Callback Phone Number:  [phone]    Thank you

## 2020-07-27 NOTE — TELEPHONE ENCOUNTER
Per communication consent, left detailed message advising pt of the same  CB# provided for any further questions or concerns

## 2021-01-06 ENCOUNTER — OFFICE VISIT (OUTPATIENT)
Dept: PAIN MEDICINE | Facility: CLINIC | Age: 56
End: 2021-01-06
Payer: COMMERCIAL

## 2021-01-06 VITALS
WEIGHT: 187 LBS | SYSTOLIC BLOOD PRESSURE: 142 MMHG | TEMPERATURE: 97.3 F | HEART RATE: 92 BPM | HEIGHT: 68 IN | BODY MASS INDEX: 28.34 KG/M2 | DIASTOLIC BLOOD PRESSURE: 90 MMHG

## 2021-01-06 DIAGNOSIS — G89.4 CHRONIC PAIN SYNDROME: Primary | ICD-10-CM

## 2021-01-06 DIAGNOSIS — M54.16 LUMBAR RADICULOPATHY: ICD-10-CM

## 2021-01-06 DIAGNOSIS — M54.42 CHRONIC LEFT-SIDED LOW BACK PAIN WITH LEFT-SIDED SCIATICA: ICD-10-CM

## 2021-01-06 DIAGNOSIS — M62.830 LUMBAR PARASPINAL MUSCLE SPASM: ICD-10-CM

## 2021-01-06 DIAGNOSIS — M53.3 SI (SACROILIAC) JOINT DYSFUNCTION: ICD-10-CM

## 2021-01-06 DIAGNOSIS — M51.26 HERNIATION OF INTERVERTEBRAL DISC OF LUMBAR REGION: ICD-10-CM

## 2021-01-06 DIAGNOSIS — M51.26 LUMBAR DISC HERNIATION: ICD-10-CM

## 2021-01-06 DIAGNOSIS — G89.29 CHRONIC LEFT-SIDED LOW BACK PAIN WITH LEFT-SIDED SCIATICA: ICD-10-CM

## 2021-01-06 PROCEDURE — 99214 OFFICE O/P EST MOD 30 MIN: CPT | Performed by: NURSE PRACTITIONER

## 2021-01-06 RX ORDER — DULOXETIN HYDROCHLORIDE 30 MG/1
CAPSULE, DELAYED RELEASE ORAL
Qty: 30 CAPSULE | Refills: 8 | Status: SHIPPED | OUTPATIENT
Start: 2021-01-06 | End: 2021-09-15 | Stop reason: SDUPTHER

## 2021-01-06 NOTE — PROGRESS NOTES
Assessment:  1  Chronic pain syndrome    2  Chronic left-sided low back pain with left-sided sciatica    3  Lumbar radiculopathy    4  SI (sacroiliac) joint dysfunction    5  Lumbar paraspinal muscle spasm    6  Herniation of intervertebral disc of lumbar region    7  Lumbar disc herniation - Left        Plan:  While the patient was in the office today, I did have a thorough conversation with the patient regarding their chronic pain syndrome, symptoms, medication regimen, and treatment plan  I discussed with the patient at this point time since she is noting significant and stable relief on a low and subtherapeutic dose of Cymbalta, without any significant side effects or issues, I feel it is reasonable and appropriate to continue with the Cymbalta as prescribed  The patient was agreeable and verbalized an understanding  The patient will follow-up in 9 months for medication prescription refill and reevaluation  The patient was advised to contact the office should their symptoms worsen in the interim  The patient was agreeable and verbalized an understanding  History of Present Illness: The patient is a 63 y o  female last seen on 6/23/20 who presents for a follow up office visit in regards to chronic pain secondary to lumbar disc herniation with degeneration as well as sacroiliitis and sacroiliac joint dysfunction with radiculopathy  The patient currently reports that since her last office visit overall her pain symptoms have remained relatively stable and manageable with her current medication regimen 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 she did note that some days there was some more radicular symptoms when she was lifting heavy packages, but still nothing she could not tolerate or managed and in general her pain is very minimal and manageable  Current pain medications includes:  Cymbalta 30 mg daily    The patient reports that this regimen is providing 80% pain relief  The patient is reporting no side effects from this pain medication regimen  I have personally reviewed and/or updated the patient's past medical history, past surgical history, family history, social history, current medications, allergies, and vital signs today  Review of Systems:    Review of Systems   Constitutional: Negative for fever and unexpected weight change  HENT: Negative for trouble swallowing  Eyes: Negative for visual disturbance  Respiratory: Negative for shortness of breath and wheezing  Cardiovascular: Negative for chest pain and palpitations  Gastrointestinal: Negative for constipation, diarrhea, nausea and vomiting  Endocrine: Negative for cold intolerance, heat intolerance and polydipsia  Genitourinary: Negative for difficulty urinating and frequency  Musculoskeletal: Negative for arthralgias, gait problem, joint swelling (Joint stiffness) and myalgias  Skin: Negative for rash  Neurological: Negative for dizziness, seizures, syncope, weakness and headaches  Hematological: Does not bruise/bleed easily  Psychiatric/Behavioral: Negative for dysphoric mood           Past Medical History:   Diagnosis Date    Chronic back pain        Past Surgical History:   Procedure Laterality Date    SINUS SURGERY         Family History   Problem Relation Age of Onset    Arthritis Mother     Liver cancer Father     Parkinsonism Father        Social History     Occupational History    Not on file   Tobacco Use    Smoking status: Never Smoker    Smokeless tobacco: Never Used   Substance and Sexual Activity    Alcohol use: Yes     Comment: rare    Drug use: No    Sexual activity: Not on file         Current Outpatient Medications:     DULoxetine (CYMBALTA) 30 mg delayed release capsule, TAKE ONE CAPSULE BY MOUTH AT BEDTIME, Disp: 30 capsule, Rfl: 8    Allergies   Allergen Reactions    Penicillins Rash       Physical Exam:    /90 (BP Location: Left arm, Patient Position: Sitting, Cuff Size: Standard)   Pulse 92   Temp (!) 97 3 °F (36 3 °C)   Ht 5' 8" (1 727 m)   Wt 84 8 kg (187 lb)   BMI 28 43 kg/m²     Constitutional:normal, well developed, well nourished, alert, in no distress and non-toxic and no overt pain behavior  Eyes:anicteric  HEENT:grossly intact  Neck:supple, symmetric, trachea midline and no masses   Pulmonary:even and unlabored  Cardiovascular:No edema or pitting edema present  Skin:Normal without rashes or lesions and well hydrated  Psychiatric:Mood and affect appropriate  Neurologic:Cranial Nerves II-XII grossly intact  Musculoskeletal:normal      Imaging  No orders to display         No orders of the defined types were placed in this encounter

## 2021-05-08 ENCOUNTER — IMMUNIZATIONS (OUTPATIENT)
Dept: FAMILY MEDICINE CLINIC | Facility: HOSPITAL | Age: 56
End: 2021-05-08

## 2021-05-08 DIAGNOSIS — Z23 ENCOUNTER FOR IMMUNIZATION: Primary | ICD-10-CM

## 2021-05-08 PROCEDURE — 91300 SARS-COV-2 / COVID-19 MRNA VACCINE (PFIZER-BIONTECH) 30 MCG: CPT

## 2021-05-08 PROCEDURE — 0001A SARS-COV-2 / COVID-19 MRNA VACCINE (PFIZER-BIONTECH) 30 MCG: CPT

## 2021-06-05 ENCOUNTER — IMMUNIZATIONS (OUTPATIENT)
Dept: FAMILY MEDICINE CLINIC | Facility: HOSPITAL | Age: 56
End: 2021-06-05

## 2021-06-05 DIAGNOSIS — Z23 ENCOUNTER FOR IMMUNIZATION: Primary | ICD-10-CM

## 2021-06-05 PROCEDURE — 91300 SARS-COV-2 / COVID-19 MRNA VACCINE (PFIZER-BIONTECH) 30 MCG: CPT | Performed by: ANESTHESIOLOGY

## 2021-06-05 PROCEDURE — 0002A SARS-COV-2 / COVID-19 MRNA VACCINE (PFIZER-BIONTECH) 30 MCG: CPT | Performed by: ANESTHESIOLOGY

## 2021-09-15 ENCOUNTER — OFFICE VISIT (OUTPATIENT)
Dept: PAIN MEDICINE | Facility: CLINIC | Age: 56
End: 2021-09-15
Payer: COMMERCIAL

## 2021-09-15 VITALS
TEMPERATURE: 98.4 F | WEIGHT: 191 LBS | DIASTOLIC BLOOD PRESSURE: 90 MMHG | HEART RATE: 73 BPM | HEIGHT: 68 IN | BODY MASS INDEX: 28.95 KG/M2 | SYSTOLIC BLOOD PRESSURE: 148 MMHG

## 2021-09-15 DIAGNOSIS — G89.4 CHRONIC PAIN SYNDROME: Primary | ICD-10-CM

## 2021-09-15 DIAGNOSIS — G89.29 CHRONIC LEFT-SIDED LOW BACK PAIN WITH LEFT-SIDED SCIATICA: ICD-10-CM

## 2021-09-15 DIAGNOSIS — M54.16 LUMBAR RADICULOPATHY: ICD-10-CM

## 2021-09-15 DIAGNOSIS — M51.26 HERNIATION OF INTERVERTEBRAL DISC OF LUMBAR REGION: ICD-10-CM

## 2021-09-15 DIAGNOSIS — M54.42 CHRONIC LEFT-SIDED LOW BACK PAIN WITH LEFT-SIDED SCIATICA: ICD-10-CM

## 2021-09-15 DIAGNOSIS — M53.3 SI (SACROILIAC) JOINT DYSFUNCTION: ICD-10-CM

## 2021-09-15 PROCEDURE — 99214 OFFICE O/P EST MOD 30 MIN: CPT | Performed by: NURSE PRACTITIONER

## 2021-09-15 RX ORDER — DULOXETIN HYDROCHLORIDE 30 MG/1
CAPSULE, DELAYED RELEASE ORAL
Qty: 30 CAPSULE | Refills: 11 | Status: SHIPPED | OUTPATIENT
Start: 2021-09-15 | End: 2022-04-20 | Stop reason: SDUPTHER

## 2021-09-15 NOTE — PROGRESS NOTES
Assessment:  1  Chronic pain syndrome    2  Chronic left-sided low back pain with left-sided sciatica    3  Lumbar radiculopathy    4  SI (sacroiliac) joint dysfunction    5  Herniation of intervertebral disc of lumbar region        Plan:   While the patient was in the office today, I did have a thorough conversation with the patient regarding their chronic pain syndrome, symptoms, medication regimen, and treatment plan  With regards to her right knee pain, I encouraged her to follow-up with orthopedics and continue with home exercise program   The patient was agreeable and verbalized an understanding  With regards to her chronic low back and leg pain and her medication regimen, since she is noting significant and stable relief on a very low and subtherapeutic dose of Cymbalta, without side effects, I feel it is reasonable and appropriate to continue the Cymbalta as prescribed  The patient was agreeable and verbalized an understanding  The patient will follow-up in 1 year for medication prescription refill and reevaluation  The patient was advised to contact the office should their symptoms worsen in the interim  The patient was agreeable and verbalized an understanding  History of Present Illness: The patient is a 61 y o  female last seen on 1/6/2021 who presents for a follow up office visit in regards to Chronic pain syndrome secondary to herniated lumbar discs with SI joint dysfunction and radiculopathy  The patient currently reports that since her last office visit overall her pain symptoms with regards to her low back and leg pain and radicular symptoms have remained the same and very stable  She reports that her biggest issue is her right knee pain which is a result of a work injury in February of this year and has just recently completed a formalized physical therapy program but continues with a home exercise program independently and feels that her knee pain is improving    The patient presents today for regular medication follow-up visit  Current pain medications includes:   Cymbalta 30 mg at bedtime  The patient reports that this regimen is providing 80% pain relief  The patient is reporting no side effects from this pain medication regimen  I have personally reviewed and/or updated the patient's past medical history, past surgical history, family history, social history, current medications, allergies, and vital signs today  Review of Systems:    Review of Systems   Respiratory: Negative for shortness of breath  Cardiovascular: Negative for chest pain  Gastrointestinal: Negative for constipation, diarrhea, nausea and vomiting  Musculoskeletal: Negative for arthralgias, gait problem, joint swelling and myalgias  Skin: Negative for rash  Neurological: Negative for dizziness, seizures and weakness  All other systems reviewed and are negative          Past Medical History:   Diagnosis Date    Chronic back pain        Past Surgical History:   Procedure Laterality Date    SINUS SURGERY         Family History   Problem Relation Age of Onset    Arthritis Mother     Liver cancer Father     Parkinsonism Father        Social History     Occupational History    Not on file   Tobacco Use    Smoking status: Never Smoker    Smokeless tobacco: Never Used   Substance and Sexual Activity    Alcohol use: Yes     Comment: rare    Drug use: No    Sexual activity: Not on file         Current Outpatient Medications:     DULoxetine (CYMBALTA) 30 mg delayed release capsule, TAKE ONE CAPSULE BY MOUTH AT BEDTIME, Disp: 30 capsule, Rfl: 11    Allergies   Allergen Reactions    Penicillins Rash       Physical Exam:    /90 (BP Location: Left arm, Patient Position: Sitting, Cuff Size: Standard)   Pulse 73   Temp 98 4 °F (36 9 °C)   Ht 5' 8" (1 727 m)   Wt 86 6 kg (191 lb)   BMI 29 04 kg/m²     Constitutional:normal, well developed, well nourished, alert, in no distress and non-toxic and no overt pain behavior  Eyes:anicteric  HEENT:grossly intact  Neck:supple, symmetric, trachea midline and no masses   Pulmonary:even and unlabored  Cardiovascular:No edema or pitting edema present  Skin:Normal without rashes or lesions and well hydrated  Psychiatric:Mood and affect appropriate  Neurologic:Cranial Nerves II-XII grossly intact  Musculoskeletal:The patient's gait is slightly antalgic, but steady without the use of any assistive devices  Imaging  No orders to display         No orders of the defined types were placed in this encounter  Discontinue Regimen: Alcortin (pt reported burning when applied) Action 4: Continue Start Regimen: Triamcinolone cream twice daily to affected areas of the legs x 2 weeks as needed Detail Level: Zone Detail Level: Detailed Otc Regimen: Aquaphor ointment as needed

## 2021-11-08 ENCOUNTER — APPOINTMENT (EMERGENCY)
Dept: CT IMAGING | Facility: HOSPITAL | Age: 56
End: 2021-11-08
Payer: OTHER MISCELLANEOUS

## 2021-11-08 ENCOUNTER — HOSPITAL ENCOUNTER (EMERGENCY)
Facility: HOSPITAL | Age: 56
Discharge: HOME | End: 2021-11-08
Attending: EMERGENCY MEDICINE | Admitting: EMERGENCY MEDICINE
Payer: OTHER MISCELLANEOUS

## 2021-11-08 VITALS
SYSTOLIC BLOOD PRESSURE: 169 MMHG | OXYGEN SATURATION: 99 % | TEMPERATURE: 97 F | HEART RATE: 71 BPM | RESPIRATION RATE: 18 BRPM | DIASTOLIC BLOOD PRESSURE: 71 MMHG

## 2021-11-08 DIAGNOSIS — S02.2XXA NASAL BONES, CLOSED FRACTURE: Primary | ICD-10-CM
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
## 2021-11-08 PROCEDURE — 99284 EMERGENCY DEPT VISIT MOD MDM: CPT | Performed by: EMERGENCY MEDICINE

## 2021-11-08 PROCEDURE — G1004 CDSM NDSC: HCPCS

## 2021-11-08 PROCEDURE — 70486 CT MAXILLOFACIAL W/O DYE: CPT

## 2021-11-08 PROCEDURE — 70450 CT HEAD/BRAIN W/O DYE: CPT

## 2021-11-08 PROCEDURE — 99283 EMERGENCY DEPT VISIT LOW MDM: CPT

## 2021-11-09 ENCOUNTER — TELEPHONE (OUTPATIENT)
Dept: NEUROLOGY | Facility: CLINIC | Age: 56
End: 2021-11-09

## 2021-11-12 ENCOUNTER — OFFICE VISIT (OUTPATIENT)
Dept: PLASTIC SURGERY | Facility: CLINIC | Age: 56
End: 2021-11-12
Payer: OTHER MISCELLANEOUS

## 2021-11-12 DIAGNOSIS — S02.2XXA CLOSED FRACTURE OF NASAL BONE, INITIAL ENCOUNTER: Primary | ICD-10-CM

## 2021-11-12 PROCEDURE — 99244 OFF/OP CNSLTJ NEW/EST MOD 40: CPT | Performed by: SURGERY

## 2021-11-17 ENCOUNTER — OFFICE VISIT (OUTPATIENT)
Dept: NEUROLOGY | Facility: CLINIC | Age: 56
End: 2021-11-17
Payer: OTHER MISCELLANEOUS

## 2021-11-17 VITALS
HEART RATE: 79 BPM | DIASTOLIC BLOOD PRESSURE: 68 MMHG | BODY MASS INDEX: 28.81 KG/M2 | WEIGHT: 189.5 LBS | SYSTOLIC BLOOD PRESSURE: 145 MMHG

## 2021-11-17 DIAGNOSIS — S06.0X0D CONCUSSION WITHOUT LOSS OF CONSCIOUSNESS, SUBSEQUENT ENCOUNTER: Primary | ICD-10-CM

## 2021-11-17 DIAGNOSIS — G44.319 ACUTE POST-TRAUMATIC HEADACHE, NOT INTRACTABLE: ICD-10-CM

## 2021-11-17 PROCEDURE — 99205 OFFICE O/P NEW HI 60 MIN: CPT | Performed by: PSYCHIATRY & NEUROLOGY

## 2022-04-20 ENCOUNTER — TELEPHONE (OUTPATIENT)
Dept: PAIN MEDICINE | Facility: CLINIC | Age: 57
End: 2022-04-20

## 2022-04-20 DIAGNOSIS — M54.16 LUMBAR RADICULOPATHY: ICD-10-CM

## 2022-04-20 DIAGNOSIS — M53.3 SI (SACROILIAC) JOINT DYSFUNCTION: ICD-10-CM

## 2022-04-20 DIAGNOSIS — G89.4 CHRONIC PAIN SYNDROME: ICD-10-CM

## 2022-04-20 DIAGNOSIS — G89.29 CHRONIC LEFT-SIDED LOW BACK PAIN WITH LEFT-SIDED SCIATICA: ICD-10-CM

## 2022-04-20 DIAGNOSIS — M51.26 HERNIATION OF INTERVERTEBRAL DISC OF LUMBAR REGION: ICD-10-CM

## 2022-04-20 DIAGNOSIS — M54.42 CHRONIC LEFT-SIDED LOW BACK PAIN WITH LEFT-SIDED SCIATICA: ICD-10-CM

## 2022-04-20 RX ORDER — DULOXETIN HYDROCHLORIDE 30 MG/1
CAPSULE, DELAYED RELEASE ORAL
Qty: 30 CAPSULE | Refills: 5 | Status: SHIPPED | OUTPATIENT
Start: 2022-04-20 | End: 2022-07-13

## 2022-04-20 NOTE — TELEPHONE ENCOUNTER
Pt stopped in office today to change her Pharmacy from [facility] to [facility]     Pt is switching because [facility] is a more convenient location -     Cymbalta 30mg Pt only has 3 pills left until next refill

## 2022-07-13 ENCOUNTER — OFFICE VISIT (OUTPATIENT)
Dept: PAIN MEDICINE | Facility: CLINIC | Age: 57
End: 2022-07-13
Payer: COMMERCIAL

## 2022-07-13 ENCOUNTER — APPOINTMENT (OUTPATIENT)
Dept: RADIOLOGY | Facility: CLINIC | Age: 57
End: 2022-07-13
Payer: COMMERCIAL

## 2022-07-13 VITALS
HEIGHT: 68 IN | WEIGHT: 210 LBS | DIASTOLIC BLOOD PRESSURE: 88 MMHG | SYSTOLIC BLOOD PRESSURE: 146 MMHG | BODY MASS INDEX: 31.83 KG/M2 | HEART RATE: 74 BPM | TEMPERATURE: 97.9 F

## 2022-07-13 DIAGNOSIS — M51.26 LUMBAR DISC HERNIATION: Primary | ICD-10-CM

## 2022-07-13 DIAGNOSIS — M51.26 HERNIATION OF INTERVERTEBRAL DISC OF LUMBAR REGION: ICD-10-CM

## 2022-07-13 DIAGNOSIS — M54.16 LUMBAR RADICULOPATHY: ICD-10-CM

## 2022-07-13 DIAGNOSIS — G89.4 CHRONIC PAIN SYNDROME: ICD-10-CM

## 2022-07-13 DIAGNOSIS — F07.81 POST CONCUSSION SYNDROME: ICD-10-CM

## 2022-07-13 DIAGNOSIS — M53.3 SI (SACROILIAC) JOINT DYSFUNCTION: ICD-10-CM

## 2022-07-13 DIAGNOSIS — M54.42 CHRONIC LEFT-SIDED LOW BACK PAIN WITH LEFT-SIDED SCIATICA: ICD-10-CM

## 2022-07-13 DIAGNOSIS — G89.29 CHRONIC LEFT-SIDED LOW BACK PAIN WITH LEFT-SIDED SCIATICA: ICD-10-CM

## 2022-07-13 PROBLEM — R45.86 EMOTIONAL LABILITY: Status: ACTIVE | Noted: 2021-12-16

## 2022-07-13 PROCEDURE — 99215 OFFICE O/P EST HI 40 MIN: CPT | Performed by: ANESTHESIOLOGY

## 2022-07-13 PROCEDURE — 72110 X-RAY EXAM L-2 SPINE 4/>VWS: CPT

## 2022-07-13 RX ORDER — DULOXETIN HYDROCHLORIDE 30 MG/1
CAPSULE, DELAYED RELEASE ORAL
Refills: 0 | OUTPATIENT
Start: 2022-07-13

## 2022-07-13 RX ORDER — DULOXETIN HYDROCHLORIDE 30 MG/1
CAPSULE, DELAYED RELEASE ORAL
Qty: 60 CAPSULE | Refills: 0 | Status: SHIPPED | OUTPATIENT
Start: 2022-07-13 | End: 2022-09-19 | Stop reason: SDUPTHER

## 2022-07-13 NOTE — PROGRESS NOTES
Assessment  1  Lumbar disc herniation    2  Lumbar radiculopathy    3  Chronic left-sided low back pain with left-sided sciatica    4  Chronic pain syndrome    5  SI (sacroiliac) joint dysfunction    6  Herniation of intervertebral disc of lumbar region    7  Post concussion syndrome        Plan    The patient's pain persists despite time, relative rest, activity modification and therapy  I believe that Cortney Jin may benefit from a lumbar epidural steroid injection to diminish any inflammatory component of her pain  I will initially use a transforaminal approach to better concentrate the steroid along the affected nerve root  If the patient does not receive significant pain relief following the initial injection, I may need to repeat using a translaminar approach  In addition his patient had recent trauma has overall generalized increased pain will increase her Cymbalta one tablet daily with a 2nd tablet every other day for seven days  She understands that if she is any side effects she will decrease to her one tablet daily and give our office a call  I did review the potential side effects of Cymbalta, not limited to, but including dizziness, drowsiness, weakness, tired feeling, nausea, diarrhea, constipation, blurred vision, headache, swelling, dry mouth or loss of balance or coordination  Prior, I will order an x-ray of the lumbar spine trial any significant or acute pathology  In the office today, we reviewed the nature of the patient's pathology in depth using  diagrams and models  I discussed the approach I would use for the epidural steroid injection and provided literature for home review  The patient understands the risks associated with the procedure including but not limited to bleeding, infection, tissue injury, decreased immunity secondary to steroid injection, exacerbation of symptoms, allergic reaction, spinal headache, and paralysis and provided verbal consent      My impressions and treatment recommendations were discussed in detail with the patient who verbalized understanding and had no further questions  Discharge instructions were provided  I personally saw and examined the patient and I agree with the above discussed plan of care  This note is created using dictation transcription  It may contain typographical errors, grammatical errors, improperly dictated words, background noise and other errors  Orders Placed This Encounter   Procedures    X-ray lumbar spine complete 4+ views     Standing Status:   Future     Standing Expiration Date:   7/13/2026     Scheduling Instructions:      Bring along any outside films relating to this procedure  Order Specific Question:   Is the patient pregnant? Answer:   Unknown    FL spine and pain procedure     Standing Status:   Future     Standing Expiration Date:   7/13/2026     Order Specific Question:   Reason for Exam:     Answer:   right S1 left L5 TFESI 1     Order Specific Question:   Is the patient pregnant? Answer:   Unknown     Order Specific Question:   Anticoagulant hold needed? Answer:   no    FL spine and pain procedure     Standing Status:   Future     Standing Expiration Date:   7/13/2026     Order Specific Question:   Reason for Exam:     Answer:   Right S1 Left L5 TFESi 2     Order Specific Question:   Is the patient pregnant? Answer:   Unknown     Order Specific Question:   Anticoagulant hold needed? Answer:   no     New Medications Ordered This Visit   Medications    DULoxetine (CYMBALTA) 30 mg delayed release capsule     Sig: TAKE ONE capusule every day and one additional every other day for 7     Dispense:  60 capsule     Refill:  0       History of Present Illness    Josias Burton is a 61 y o  female who has been maintained on Cymbalta 30 mg daily  She denies any side effects but she has a concussion after a fall in November of 2021    Since then she is had worsening low back and lower extremity pain  She was last seen for epidural steroid injections 2018  MRI was reviewed with the patient today  Her pain is 10/10 on the visual analog scale completely interfering with daily living activities  Her pain is throughout the day and constant she is undergone physical therapy describes burning sensation down her left leg sharp sensation into her low back  Standing walking aggravate her symptoms while rest relaxation somewhat reduces her pain  I have personally reviewed and/or updated the patient's past medical history, past surgical history, family history, social history, current medications, allergies, and vital signs today  Review of Systems   Constitutional: Negative for fever and unexpected weight change  HENT: Negative for trouble swallowing  Eyes: Negative for visual disturbance  Respiratory: Negative for shortness of breath and wheezing  Cardiovascular: Negative for chest pain and palpitations  Gastrointestinal: Negative for constipation, diarrhea, nausea and vomiting  Endocrine: Negative for cold intolerance, heat intolerance and polydipsia  Genitourinary: Negative for difficulty urinating and frequency  Musculoskeletal: Positive for gait problem  Negative for arthralgias, joint swelling and myalgias  Skin: Negative for rash  Neurological: Positive for dizziness  Negative for seizures, syncope, weakness and headaches  Hematological: Does not bruise/bleed easily  Psychiatric/Behavioral: Negative for dysphoric mood  All other systems reviewed and are negative        Patient Active Problem List   Diagnosis    Lumbar paraspinal muscle spasm    SI (sacroiliac) joint dysfunction    Lumbar radiculopathy    Herniation of intervertebral disc of lumbar region    Lumbar disc herniation - Left    Chronic left-sided low back pain with left-sided sciatica    Chronic pain syndrome    Post concussion syndrome    Emotional lability       Past Medical History: Diagnosis Date    Chronic back pain        Past Surgical History:   Procedure Laterality Date    SINUS SURGERY         Family History   Problem Relation Age of Onset    Arthritis Mother     Liver cancer Father     Parkinsonism Father        Social History     Occupational History    Not on file   Tobacco Use    Smoking status: Never Smoker    Smokeless tobacco: Never Used   Substance and Sexual Activity    Alcohol use: Yes     Comment: 3x week    Drug use: No    Sexual activity: Not on file       Current Outpatient Medications on File Prior to Visit   Medication Sig    [DISCONTINUED] DULoxetine (CYMBALTA) 30 mg delayed release capsule TAKE ONE CAPSULE BY MOUTH AT BEDTIME     No current facility-administered medications on file prior to visit  Allergies   Allergen Reactions    Penicillins Rash       Physical Exam    /88 (BP Location: Left arm, Patient Position: Sitting, Cuff Size: Standard)   Pulse 74   Temp 97 9 °F (36 6 °C)   Ht 5' 8" (1 727 m)   Wt 95 3 kg (210 lb)   BMI 31 93 kg/m²     Constitutional: normal, well developed, well nourished, alert, in no distress and non-toxic and no overt pain behavior   and overweight  Eyes: anicteric  HEENT: grossly intact  Neck: supple, symmetric, trachea midline and no masses   Pulmonary:even and unlabored  Cardiovascular:No edema or pitting edema present  Skin:Normal without rashes or lesions and well hydrated  Psychiatric:Mood and affect appropriate  Neurologic:Cranial Nerves II-XII grossly intact  Musculoskeletal:normal, Difficulty going from sitting to standing sitting position; no obvious skin lesions or erythema lumbar sacral spine; mild tenderness in lumbar paravertebrals, no sacroiliac or greater trochanteric tenderness bilateral; deep tendon reflexes are diminished but symmetrical bilateral patellar and achilles; decreased sensation left L5 distribution to pinwheel; no focal motor deficit appreciated lower limbs; negative bilateral straight leg raising  Imaging  MRI LUMBAR SPINE WITHOUT CONTRAST @  6-22-18     INDICATION: M62 830: Muscle spasm of back  M53 3: Sacrococcygeal disorders, not elsewhere classified  M54 16: Radiculopathy, lumbar region   Lifting injury  Left leg and foot pain/numbness      COMPARISON:  None      TECHNIQUE:  Sagittal T1, sagittal T2, sagittal inversion recovery, axial T1 and axial T2, coronal T2        IMAGE QUALITY:  Diagnostic     FINDINGS:     ALIGNMENT:  Normal alignment of the lumbar spine  No compression fracture  No spondylolysis or spondylolisthesis  No scoliosis      MARROW SIGNAL:  Normal marrow signal is identified within the visualized bony structures  No discrete marrow lesion      DISTAL CORD AND CONUS:  Normal size and signal within the distal cord and conus  The conus ends at the L2 level      PARASPINAL SOFT TISSUES:  Paraspinal soft tissues are unremarkable      SACRUM:  Normal signal within the sacrum  No evidence of insufficiency or stress fracture      LOWER THORACIC DISC SPACES:  Normal disc height and signal   No disc herniation, canal stenosis or foraminal narrowing      LUMBAR DISC SPACES:     L1-L2:  There is a small left subarticular disc herniation minimally distorting the ventral aspect of the thecal sac, series 5 image 3  The canal is widely patent  No nerve impingement  No foraminal narrowing      L2-L3:  Moderate broad-based left subarticular and foraminal disc protrusion  This disc herniation distorts the left anterolateral aspect of the thecal sac  Only slight left foraminal narrowing without discrete nerve impingement      L3-L4:  Normal disc height and signal   No disc herniation, canal stenosis or foraminal narrowing      L4-L5:  Disc desiccation with slight loss of disc height  Moderate diffuse annular bulging with a broad-based central, left paracentral and subarticular disc herniation with some degree of extrusion    There is significant mass effect upon the left   anterolateral aspect of the thecal sac and left L5 nerve  Only minimal foraminal narrowing without discrete L4 nerve impingement      L5-S1:  Disc desiccation and loss of disc height  Small broad-based left foraminal disc protrusion  Mild endplate hypertrophic change and slight facet arthropathy  No significant canal stenosis  Mild left greater than right foraminal narrowing      IMPRESSION:     Moderately large somewhat lobulated disc herniation/extrusion at L4-5 on the left distorting the anterolateral aspect of the thecal sac and compressing the L5 nerve  Correlate for corresponding radiculopathy      Small disc herniations at L1-2, L2-3 and L5-S1 without discrete nerve impingement      The study was marked in EPIC for significant notification      I have personally reviewed pertinent films in PACS and my interpretation is Multilevel spondylosis with disc herniation

## 2022-07-14 DIAGNOSIS — M54.16 LUMBAR RADICULOPATHY: ICD-10-CM

## 2022-07-14 DIAGNOSIS — M51.26 HERNIATION OF INTERVERTEBRAL DISC OF LUMBAR REGION: ICD-10-CM

## 2022-07-14 DIAGNOSIS — G89.4 CHRONIC PAIN SYNDROME: ICD-10-CM

## 2022-07-14 DIAGNOSIS — M53.3 SI (SACROILIAC) JOINT DYSFUNCTION: ICD-10-CM

## 2022-07-14 DIAGNOSIS — M54.42 CHRONIC LEFT-SIDED LOW BACK PAIN WITH LEFT-SIDED SCIATICA: ICD-10-CM

## 2022-07-14 DIAGNOSIS — G89.29 CHRONIC LEFT-SIDED LOW BACK PAIN WITH LEFT-SIDED SCIATICA: ICD-10-CM

## 2022-07-14 RX ORDER — DULOXETIN HYDROCHLORIDE 30 MG/1
CAPSULE, DELAYED RELEASE ORAL
Refills: 0 | OUTPATIENT
Start: 2022-07-14

## 2022-07-14 NOTE — TELEPHONE ENCOUNTER
S/w pharmacist  Stated that cymbalta is not new for this patient - she has been taking cymbalta x 1 year  Confirmed pt has been taking 30 mg daily  Advised pharmacist - this is a titration to 60 mg / day  To be done gradually  Pharmacist verbalized understanding and appreciation

## 2022-07-14 NOTE — TELEPHONE ENCOUNTER
S/w pharmacist  Called in cymbalta 30 mg 1 pill eod 2 pills on the alternate days x 7 days then 2 pills daily  #180 / 0 provided Dr Najma Berry and the office number as requested  Pharmacist verbalized understanding and appreciation

## 2022-07-14 NOTE — TELEPHONE ENCOUNTER
S/w pharmacist, confirmed the pt's insurance requires a 90 day supply of this medication  Confirmed si pill every other day, 2 pills on the opposite day x 1 week  Increase to 2 pills daily  Advised pharmacist, the writer will zane goldberg/ Dr Steph Gloria  Pharmacist verbalized understanding and appreciation

## 2022-07-18 ENCOUNTER — HOSPITAL ENCOUNTER (OUTPATIENT)
Dept: RADIOLOGY | Facility: CLINIC | Age: 57
Discharge: HOME | End: 2022-07-18
Admitting: ANESTHESIOLOGY
Payer: COMMERCIAL

## 2022-07-18 VITALS
RESPIRATION RATE: 18 BRPM | HEART RATE: 76 BPM | DIASTOLIC BLOOD PRESSURE: 82 MMHG | SYSTOLIC BLOOD PRESSURE: 130 MMHG | OXYGEN SATURATION: 94 % | TEMPERATURE: 97.3 F

## 2022-07-18 DIAGNOSIS — M54.16 LUMBAR RADICULOPATHY: ICD-10-CM

## 2022-07-18 DIAGNOSIS — M51.26 LUMBAR DISC HERNIATION: ICD-10-CM

## 2022-07-18 PROCEDURE — 64484 NJX AA&/STRD TFRM EPI L/S EA: CPT | Performed by: ANESTHESIOLOGY

## 2022-07-18 PROCEDURE — 64483 NJX AA&/STRD TFRM EPI L/S 1: CPT | Performed by: ANESTHESIOLOGY

## 2022-07-18 RX ORDER — METHYLPREDNISOLONE ACETATE 80 MG/ML
160 INJECTION, SUSPENSION INTRA-ARTICULAR; INTRALESIONAL; INTRAMUSCULAR; PARENTERAL; SOFT TISSUE ONCE
Status: COMPLETED | OUTPATIENT
Start: 2022-07-18 | End: 2022-07-18

## 2022-07-18 RX ADMIN — IOHEXOL 1 ML: 300 INJECTION, SOLUTION INTRAVENOUS at 13:09

## 2022-07-18 RX ADMIN — METHYLPREDNISOLONE ACETATE 160 MG: 80 INJECTION, SUSPENSION INTRA-ARTICULAR; INTRALESIONAL; INTRAMUSCULAR; SOFT TISSUE at 13:09

## 2022-07-18 NOTE — H&P
History of Present Illness: The patient is a 61 y o  female who presents with complaints of low back and leg pain  Patient Active Problem List   Diagnosis    Lumbar paraspinal muscle spasm    SI (sacroiliac) joint dysfunction    Lumbar radiculopathy    Herniation of intervertebral disc of lumbar region    Lumbar disc herniation - Left    Chronic left-sided low back pain with left-sided sciatica    Chronic pain syndrome    Post concussion syndrome    Emotional lability       Past Medical History:   Diagnosis Date    Chronic back pain        Past Surgical History:   Procedure Laterality Date    SINUS SURGERY           Current Outpatient Medications:     DULoxetine (CYMBALTA) 30 mg delayed release capsule, TAKE ONE capusule every day and one additional every other day for 7, Disp: 60 capsule, Rfl: 0    Current Facility-Administered Medications:     iohexol (OMNIPAQUE) 300 mg/mL injection 50 mL, 50 mL, Epidural, Once, Everardo Escudero,     methylPREDNISolone acetate (DEPO-MEDROL) injection 160 mg, 160 mg, Epidural, Once, Everardo Escudero,     Allergies   Allergen Reactions    Penicillins Rash       Physical Exam:   Vitals:    07/18/22 1254   BP: 141/80   Pulse: 76   Resp: 18   Temp: (!) 97 3 °F (36 3 °C)   SpO2: 92%     General: Awake, Alert, Oriented x 3, Mood and affect appropriate  Respiratory: Respirations even and unlabored  Cardiovascular: Peripheral pulses intact; no edema  Musculoskeletal Exam:  Decreased range of motion lumbar spine    ASA Score: II    Patient/Chart Verification  Patient ID Verified: Verbal  ID Band Applied: No  Consents Confirmed: Procedural  H&P( within 30 days) Verified: To be obtained in the Pre-Procedure area  Interval H&P(within 24 hr) Complete (required for Outpatients and Surgery Admit only): To be obtained in the Pre-Procedure area  Allergies Reviewed:  Yes  Anticoag/NSAID held?: No  Currently on antibiotics?: No  Pre-op Lab/Test Results Available: N/A  Pregnancy Lab Collected: N/A comment    Assessment:   1  Lumbar radiculopathy    2   Lumbar disc herniation        Plan: right S1 left L5 TFESI 1

## 2022-07-18 NOTE — DISCHARGE INSTRUCTIONS
Epidural Steroid Injection   WHAT YOU NEED TO KNOW:   An epidural steroid injection (AYDEN) is a procedure to inject steroid medicine into the epidural space  The epidural space is between your spinal cord and vertebrae  Steroids reduce inflammation and fluid buildup in your spine that may be causing pain  You may be given pain medicine along with the steroids  ACTIVITY  Do not drive or operate machinery today  No strenuous activity today - bending, lifting, etc   You may resume normal activites starting tomorrow - start slowly and as tolerated  You may shower today, but no tub baths or hot tubs  You may have numbness for several hours from the local anesthetic  Please use caution and common sense, especially with weight-bearing activities  CARE OF THE INJECTION SITE  If you have soreness or pain, apply ice to the area today (20 minutes on/20 minutes off)  Starting tomorrow, you may use warm, moist heat or ice if needed  You may have an increase or change in your discomfort for 36-48 hours after your treatment  Apply ice and continue with any pain medication you have been prescribed  Notify the [facility] if you have any of the following: redness, drainage, swelling, headache, stiff neck or fever above 100°F     SPECIAL INSTRUCTIONS  Our office will contact you in approximately 7 days for a progress report  MEDICATIONS  Continue to take all routine medications  Our office may have instructed you to hold some medications  As no general anesthesia was used in today's procedure, you should not experience any side effects related to anesthesia  If you have a problem specifically related to your procedure, please call our office at [phone]  Problems not related to your procedure should be directed to your primary care physician

## 2022-07-25 ENCOUNTER — TELEPHONE (OUTPATIENT)
Dept: PAIN MEDICINE | Facility: CLINIC | Age: 57
End: 2022-07-25

## 2022-07-28 NOTE — TELEPHONE ENCOUNTER
Pt called in to cancel her procedure on 08/01/2022. Will call back if anything changes .      Please be advised thank you.  Pt can be reached @   [phone]

## 2022-09-19 ENCOUNTER — OFFICE VISIT (OUTPATIENT)
Dept: PAIN MEDICINE | Facility: CLINIC | Age: 57
End: 2022-09-19
Payer: COMMERCIAL

## 2022-09-19 VITALS
HEART RATE: 67 BPM | TEMPERATURE: 97.9 F | BODY MASS INDEX: 28.95 KG/M2 | DIASTOLIC BLOOD PRESSURE: 82 MMHG | HEIGHT: 68 IN | WEIGHT: 191 LBS | SYSTOLIC BLOOD PRESSURE: 126 MMHG

## 2022-09-19 DIAGNOSIS — G89.4 CHRONIC PAIN SYNDROME: Primary | ICD-10-CM

## 2022-09-19 DIAGNOSIS — M51.26 HERNIATION OF INTERVERTEBRAL DISC OF LUMBAR REGION: ICD-10-CM

## 2022-09-19 DIAGNOSIS — M54.42 CHRONIC LEFT-SIDED LOW BACK PAIN WITH LEFT-SIDED SCIATICA: ICD-10-CM

## 2022-09-19 DIAGNOSIS — M53.3 SI (SACROILIAC) JOINT DYSFUNCTION: ICD-10-CM

## 2022-09-19 DIAGNOSIS — G89.29 CHRONIC LEFT-SIDED LOW BACK PAIN WITH LEFT-SIDED SCIATICA: ICD-10-CM

## 2022-09-19 DIAGNOSIS — M54.16 LUMBAR RADICULOPATHY: ICD-10-CM

## 2022-09-19 PROCEDURE — 99214 OFFICE O/P EST MOD 30 MIN: CPT | Performed by: NURSE PRACTITIONER

## 2022-09-19 RX ORDER — DULOXETIN HYDROCHLORIDE 60 MG/1
CAPSULE, DELAYED RELEASE ORAL
Qty: 90 CAPSULE | Refills: 1 | Status: SHIPPED | OUTPATIENT
Start: 2022-09-19

## 2022-09-19 NOTE — PROGRESS NOTES
Assessment:  1  Chronic pain syndrome    2  Chronic left-sided low back pain with left-sided sciatica    3  Lumbar radiculopathy    4  SI (sacroiliac) joint dysfunction    5  Herniation of intervertebral disc of lumbar region        Plan:  While the patient was in the office today, I did have a thorough conversation with the patient regarding their chronic pain syndrome, symptoms, medication regimen, and treatment plan  I discussed with the patient that since she is noting significant and stable overall relief of her chronic pain symptoms as a result of the previous injections and her medication regimen, for now, we will hold off any repeat injections and see how she does over the next few months  However, I did explain to the patient that if she feels she needs a repeat set of injections, she should call our office and we will proceed from there as appropriate  The patient was agreeable and verbalized an understanding  With regards to her medication regimen, I discussed with the patient that since she is noting significant and stable relief of her chronic pain symptoms, without any significant side effects or issues, on a therapeutic dose of Cymbalta, I feel it is reasonable and appropriate to continue the Cymbalta as prescribed for now  The patient was agreeable and verbalized an understanding  The patient will follow-up in 6 months for medication prescription refill and reevaluation  The patient was advised to contact the office should their symptoms worsen in the interim  The patient was agreeable and verbalized an understanding  History of Present Illness: The patient is a 61 y o  female last seen on 7/18/2022 who presents for a follow up office visit in regards to chronic pain syndrome, as the patient's pain has been ongoing for greater than a year, secondary to herniated lumbar disc with radiculopathy and SI joint dysfunction    The patient currently reports that since her last office visit as she is status post a right S1 transforaminal epidural steroid injection and a left L5 transforaminal epidural steroid injection at the same time on July 18, 2022 with Dr [place] that there is at least 70-80% overall improvement in her pain symptoms as a result of the injection and the increase in the Cymbalta to the 60 mg a day  The patient presents today for regular postprocedure and medication follow-up visit  Current pain medications includes:  Cymbalta 60 mg daily  The patient reports that this regimen is providing 80-90% pain relief  The patient is reporting no side effects from this pain medication regimen  I have personally reviewed and/or updated the patient's past medical history, past surgical history, family history, social history, current medications, allergies, and vital signs today  Review of Systems:    Review of Systems   Respiratory: Negative for shortness of breath  Cardiovascular: Negative for chest pain  Gastrointestinal: Negative for constipation, diarrhea, nausea and vomiting  Musculoskeletal: Negative for arthralgias, gait problem, joint swelling and myalgias  Skin: Negative for rash  Neurological: Negative for dizziness, seizures and weakness  All other systems reviewed and are negative          Past Medical History:   Diagnosis Date    Chronic back pain        Past Surgical History:   Procedure Laterality Date    SINUS SURGERY         Family History   Problem Relation Age of Onset    Arthritis Mother     Liver cancer Father     Parkinsonism Father        Social History     Occupational History    Not on file   Tobacco Use    Smoking status: Never Smoker    Smokeless tobacco: Never Used   Substance and Sexual Activity    Alcohol use: Yes     Comment: 3x week    Drug use: No    Sexual activity: Not on file         Current Outpatient Medications:     DULoxetine (CYMBALTA) 60 mg delayed release capsule, Take 1 PO HS , Disp: 90 capsule, Rfl: 1    Allergies   Allergen Reactions    Penicillins Rash       Physical Exam:    /82 (BP Location: Left arm, Patient Position: Sitting, Cuff Size: Standard)   Pulse 67   Temp 97 9 °F (36 6 °C)   Ht 5' 8" (1 727 m)   Wt 86 6 kg (191 lb)   BMI 29 04 kg/m²     Constitutional:normal, well developed, well nourished, alert, in no distress and non-toxic and no overt pain behavior  Eyes:anicteric  HEENT:grossly intact  Neck:supple, symmetric, trachea midline and no masses   Pulmonary:even and unlabored  Cardiovascular:No edema or pitting edema present  Skin:Normal without rashes or lesions and well hydrated  Psychiatric:Mood and affect appropriate  Neurologic:Cranial Nerves II-XII grossly intact  Musculoskeletal:normal      Imaging  No orders to display         No orders of the defined types were placed in this encounter

## 2023-05-11 ENCOUNTER — OFFICE VISIT (OUTPATIENT)
Dept: PAIN MEDICINE | Facility: CLINIC | Age: 58
End: 2023-05-11

## 2023-05-11 VITALS
WEIGHT: 154 LBS | HEIGHT: 68 IN | DIASTOLIC BLOOD PRESSURE: 80 MMHG | TEMPERATURE: 98.2 F | BODY MASS INDEX: 23.34 KG/M2 | SYSTOLIC BLOOD PRESSURE: 110 MMHG

## 2023-05-11 DIAGNOSIS — M48.061 SPINAL STENOSIS OF LUMBAR REGION, UNSPECIFIED WHETHER NEUROGENIC CLAUDICATION PRESENT: ICD-10-CM

## 2023-05-11 DIAGNOSIS — M53.3 SI (SACROILIAC) JOINT DYSFUNCTION: ICD-10-CM

## 2023-05-11 DIAGNOSIS — G89.29 CHRONIC BILATERAL LOW BACK PAIN, UNSPECIFIED WHETHER SCIATICA PRESENT: ICD-10-CM

## 2023-05-11 DIAGNOSIS — G89.29 CHRONIC LEFT-SIDED LOW BACK PAIN WITH LEFT-SIDED SCIATICA: ICD-10-CM

## 2023-05-11 DIAGNOSIS — M54.16 LUMBAR RADICULOPATHY: ICD-10-CM

## 2023-05-11 DIAGNOSIS — M54.42 CHRONIC LEFT-SIDED LOW BACK PAIN WITH LEFT-SIDED SCIATICA: ICD-10-CM

## 2023-05-11 DIAGNOSIS — G89.4 CHRONIC PAIN SYNDROME: Primary | ICD-10-CM

## 2023-05-11 DIAGNOSIS — M51.16 INTERVERTEBRAL DISC DISORDER WITH RADICULOPATHY OF LUMBAR REGION: ICD-10-CM

## 2023-05-11 DIAGNOSIS — M51.26 HERNIATION OF INTERVERTEBRAL DISC OF LUMBAR REGION: ICD-10-CM

## 2023-05-11 DIAGNOSIS — M54.50 CHRONIC BILATERAL LOW BACK PAIN, UNSPECIFIED WHETHER SCIATICA PRESENT: ICD-10-CM

## 2023-05-11 RX ORDER — DULOXETIN HYDROCHLORIDE 60 MG/1
CAPSULE, DELAYED RELEASE ORAL
Qty: 90 CAPSULE | Refills: 3 | Status: SHIPPED | OUTPATIENT
Start: 2023-05-11

## 2023-05-11 RX ORDER — SUMATRIPTAN 25 MG/1
TABLET, FILM COATED ORAL
COMMUNITY
Start: 2023-03-01

## 2023-05-11 NOTE — PROGRESS NOTES
Assessment:  1  Chronic pain syndrome    2  Chronic bilateral low back pain, unspecified whether sciatica present    3  Intervertebral disc disorder with radiculopathy of lumbar region    4  Spinal stenosis of lumbar region, unspecified whether neurogenic claudication present    5  Lumbar radiculopathy    6  Chronic left-sided low back pain with left-sided sciatica    7  SI (sacroiliac) joint dysfunction    8  Herniation of intervertebral disc of lumbar region        Plan:  The patient is a 60 y o  female last seen on 09/19/2022 who presents for a follow up office visit in regards to chronic pain secondary to low back pain, lumbar intervertebral disc disorder with radiculopathy, and lumbar stenosis  Patient presents today with ongoing low back and left leg pain  Her pain has been stable since the last office visit  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  She continues to take Cymbalta 60 mg 1 tablet at bedtime which is providing moderate pain relief without side effects  Therefore, I will continue her on the medication as prescribed  A prescription for Cymbalta 60 mg with 3 refills was electronically sent to the pharmacy  The patient had underwent a right S1 transforaminal epidural steroid injection in July 2022  If the pain was worsens, she was instructed to contact the office, and this injection can be repeated  The patient will follow-up in 1 year for medication prescription refill and reevaluation  The patient was advised to contact the office should their symptoms worsen in the interim  The patient was agreeable and verbalized an understanding  History of Present Illness: The patient is a 60 y o  female last seen on 09/19/2022 who presents for a follow up office visit in regards to chronic pain secondary to low back pain, lumbar intervertebral disc disorder with radiculopathy, and lumbar stenosis  She also has a history of work related injury that caused a TBI  Her last office visit was September 19, 2022, in which she had received 70-80% pain relief after a right S1 transforaminal epidural steroid injection in July 2022  Patient presents today with ongoing low back pain  The pain also radiates into the left leg  She states since last office visit 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  She states she has been able to control her pain and keep it at a manageable degree  When the pain occurs it is intermittent but mostly in the evening  She describes it as burning, dull aching, sharp, numbness, and pins-and-needles  She is currently rating her pain a 4/10 on the numeric rating scale  She is currently taking Cymbalta 60 mg 1 tablet at night which is providing relief without side effects  I have personally reviewed and/or updated the patient's past medical history, past surgical history, family history, social history, current medications, allergies, and vital signs today  Review of Systems:    Review of Systems      Past Medical History:   Diagnosis Date   • Chronic back pain    • Headache(784 0) 10/08/2021    Head trauma       Past Surgical History:   Procedure Laterality Date   • SINUS SURGERY         Family History   Problem Relation Age of Onset   • Arthritis Mother    • Liver cancer Father    • Parkinsonism Father    • Alcohol abuse Father    • Cancer Father        Social History     Occupational History   • Not on file   Tobacco Use   • Smoking status: Former     Packs/day: 0 25     Years: 2 00     Pack years: 0 50     Types: Cigarettes   • Smokeless tobacco: Never   Vaping Use   • Vaping Use: Never used   Substance and Sexual Activity   • Alcohol use:  Yes     Alcohol/week: 2 0 standard drinks     Types: 2 Glasses of wine per week     Comment: 3x week   • Drug use: No   • Sexual activity: Not Currently     Partners: Male         Current Outpatient Medications:   •  DULoxetine (CYMBALTA) 60 mg delayed release "capsule, Take 1 PO HS , Disp: 90 capsule, Rfl: 1  •  propranolol (INNOPRAN XL) 80 MG 24 hr capsule, Take 1 capsule by mouth daily, Disp: , Rfl:   •  SUMAtriptan (IMITREX) 25 mg tablet, TAKE 1 TABLET ORALLY AT ONSET OF SEVERE HEADACHE  MAY REPEAT DOSE AFTER 2 HOURS IF NEEDED (Patient not taking: Reported on 5/11/2023), Disp: , Rfl:     Allergies   Allergen Reactions   • Bee Venom Anaphylaxis   • Penicillins Rash       Physical Exam:    /80 (BP Location: Left arm, Patient Position: Sitting, Cuff Size: Adult)   Temp 98 2 °F (36 8 °C)   Ht 5' 8\" (1 727 m) Comment: verbal  Wt 69 9 kg (154 lb)   BMI 23 42 kg/m²     Constitutional:normal, well developed, well nourished, alert, in no distress and non-toxic and no overt pain behavior  Eyes:anicteric  HEENT:grossly intact  Neck:supple, symmetric, trachea midline and no masses   Pulmonary:even and unlabored  Cardiovascular:No edema or pitting edema present  Skin:Normal without rashes or lesions and well hydrated  Psychiatric:Mood and affect appropriate  Neurologic:Cranial Nerves II-XII grossly intact  Musculoskeletal:normal      Imaging    MRI LUMBAR SPINE WITHOUT CONTRAST     INDICATION: M62 830: Muscle spasm of back  M53 3: Sacrococcygeal disorders, not elsewhere classified  M54 16: Radiculopathy, lumbar region   Lifting injury  Left leg and foot pain/numbness      COMPARISON:  None      TECHNIQUE:  Sagittal T1, sagittal T2, sagittal inversion recovery, axial T1 and axial T2, coronal T2        IMAGE QUALITY:  Diagnostic     FINDINGS:     ALIGNMENT:  Normal alignment of the lumbar spine  No compression fracture  No spondylolysis or spondylolisthesis  No scoliosis      MARROW SIGNAL:  Normal marrow signal is identified within the visualized bony structures  No discrete marrow lesion      DISTAL CORD AND CONUS:  Normal size and signal within the distal cord and conus    The conus ends at the L2 level      PARASPINAL SOFT TISSUES:  Paraspinal soft tissues are " unremarkable      SACRUM:  Normal signal within the sacrum  No evidence of insufficiency or stress fracture      LOWER THORACIC DISC SPACES:  Normal disc height and signal   No disc herniation, canal stenosis or foraminal narrowing      LUMBAR DISC SPACES:     L1-L2:  There is a small left subarticular disc herniation minimally distorting the ventral aspect of the thecal sac, series 5 image 3  The canal is widely patent  No nerve impingement  No foraminal narrowing      L2-L3:  Moderate broad-based left subarticular and foraminal disc protrusion  This disc herniation distorts the left anterolateral aspect of the thecal sac  Only slight left foraminal narrowing without discrete nerve impingement      L3-L4:  Normal disc height and signal   No disc herniation, canal stenosis or foraminal narrowing      L4-L5:  Disc desiccation with slight loss of disc height  Moderate diffuse annular bulging with a broad-based central, left paracentral and subarticular disc herniation with some degree of extrusion  There is significant mass effect upon the left   anterolateral aspect of the thecal sac and left L5 nerve  Only minimal foraminal narrowing without discrete L4 nerve impingement      L5-S1:  Disc desiccation and loss of disc height  Small broad-based left foraminal disc protrusion  Mild endplate hypertrophic change and slight facet arthropathy  No significant canal stenosis  Mild left greater than right foraminal narrowing      IMPRESSION:     Moderately large somewhat lobulated disc herniation/extrusion at L4-5 on the left distorting the anterolateral aspect of the thecal sac and compressing the L5 nerve  Correlate for corresponding radiculopathy      Small disc herniations at L1-2, L2-3 and L5-S1 without discrete nerve impingement      The study was marked in EPIC for significant notification      No orders to display         No orders of the defined types were placed in this encounter

## 2023-10-23 NOTE — TELEPHONE ENCOUNTER
Has an appt today with the doctor  I did call trying to explain to her about the medications   She feels that she wants to see the doctor Patient already received wipes from the office and aware to do night before and morning of.

## 2024-05-09 ENCOUNTER — OFFICE VISIT (OUTPATIENT)
Dept: PAIN MEDICINE | Facility: CLINIC | Age: 59
End: 2024-05-09
Payer: COMMERCIAL

## 2024-05-09 VITALS
HEIGHT: 68 IN | BODY MASS INDEX: 27.83 KG/M2 | TEMPERATURE: 97.6 F | WEIGHT: 183.6 LBS | DIASTOLIC BLOOD PRESSURE: 66 MMHG | SYSTOLIC BLOOD PRESSURE: 112 MMHG

## 2024-05-09 DIAGNOSIS — G89.4 CHRONIC PAIN SYNDROME: ICD-10-CM

## 2024-05-09 DIAGNOSIS — M53.3 SI (SACROILIAC) JOINT DYSFUNCTION: ICD-10-CM

## 2024-05-09 DIAGNOSIS — M54.16 LUMBAR RADICULOPATHY: ICD-10-CM

## 2024-05-09 DIAGNOSIS — M48.061 FORAMINAL STENOSIS OF LUMBAR REGION: ICD-10-CM

## 2024-05-09 DIAGNOSIS — M54.42 CHRONIC LEFT-SIDED LOW BACK PAIN WITH LEFT-SIDED SCIATICA: ICD-10-CM

## 2024-05-09 DIAGNOSIS — G89.29 CHRONIC LEFT-SIDED LOW BACK PAIN WITH LEFT-SIDED SCIATICA: ICD-10-CM

## 2024-05-09 DIAGNOSIS — M51.16 INTERVERTEBRAL DISC DISORDER WITH RADICULOPATHY OF LUMBAR REGION: ICD-10-CM

## 2024-05-09 PROCEDURE — 99214 OFFICE O/P EST MOD 30 MIN: CPT | Performed by: NURSE PRACTITIONER

## 2024-05-09 RX ORDER — DULOXETIN HYDROCHLORIDE 60 MG/1
CAPSULE, DELAYED RELEASE ORAL
Qty: 90 CAPSULE | Refills: 3 | Status: SHIPPED | OUTPATIENT
Start: 2024-05-09

## 2024-05-09 NOTE — PROGRESS NOTES
Assessment:  1. Intervertebral disc disorder with radiculopathy of lumbar region    2. Lumbar radiculopathy    3. Foraminal stenosis of lumbar region    4. Chronic pain syndrome    5. Chronic left-sided low back pain with left-sided sciatica    6. SI (sacroiliac) joint dysfunction        Plan:  The patient is a 59 y.o. female last seen on 5/11/23 who presents for a follow up office visit in regards to chronic pain secondary to low back pain, lumbar intervertebral disc disorder with radiculopathy and lumbar stenosis.   The patient presents today with ongoing low back pain.  The pain has been stable over the past 12 months.  She is currently taking Cymbalta 60 mg 1 tablet daily which is providing 80% pain relief without side effects.  Therefore, I will continue her on the medication as prescribed and refills for Cymbalta 60 mg were sent to the pharmacy.    The patient had underwent a right S1 transforaminal epidural steroid injection in July 2022 which had provided 70 to 80% pain relief.  She is currently experience symptoms primarily on the left side.  MRI lumbar spine shows left paracentral disc herniation L4-L5 with significant mass effect upon the left anterior aspect of the thecal sac and left L5 nerve.  If symptoms would worsen, she was instructed to contact the office and a left L4 and L5 transforaminal epidural steroid injection can be performed      The patient will follow-up in 1 year for medication prescription refill and reevaluation. The patient was advised to contact the office should their symptoms worsen in the interim. The patient was agreeable and verbalized an understanding.        History of Present Illness:    The patient is a 59 y.o. female last seen on 5/11/23 who presents for a follow up office visit in regards to chronic pain secondary to low back pain, lumbar intervertebral disc disorder with radiculopathy and lumbar stenosis.   She also has a history of work related injury that caused a TBI.  Her last office visit was May 11, 2023, in which she was continued on Cymbalta 60 mg.  Patient presents today with ongoing low back pain.  Her pain remains unchanged since the last office visit.  The pain is located primarily on the left side of the low back and in the left foot, occasionally the left calf.  The pain occurs on occasion and is described as dull aching, sharp, numbness, and pins-and-needles.  She is rating her pain a 3/10 on the numeric rating scale.    She is currently taking Cymbalta 60 mg 1 tablet daily, which is providing 80% pain relief without side effects.    The patient had underwent a right S1 transforaminal epidural steroid injection in July 2022 which had provided 70-80% pain relief.    I have personally reviewed and/or updated the patient's past medical history, past surgical history, family history, social history, current medications, allergies, and vital signs today.       Review of Systems:    Review of Systems   Respiratory:  Negative for shortness of breath.    Cardiovascular:  Negative for chest pain.   Gastrointestinal:  Negative for constipation, diarrhea, nausea and vomiting.   Musculoskeletal:  Negative for arthralgias, gait problem, joint swelling and myalgias.        Decreased ROM  Cramping & numbness in L foot   Skin:  Negative for rash.   Neurological:  Negative for dizziness, seizures and weakness.   All other systems reviewed and are negative.        Past Medical History:   Diagnosis Date    Chronic back pain     Headache(784.0) 10/08/2021    Head trauma       Past Surgical History:   Procedure Laterality Date    SINUS SURGERY         Family History   Problem Relation Age of Onset    Arthritis Mother     Liver cancer Father     Parkinsonism Father     Alcohol abuse Father     Cancer Father        Social History     Occupational History    Not on file   Tobacco Use    Smoking status: Former     Current packs/day: 0.25     Average packs/day: 0.3 packs/day for 2.0 years (0.5  "ttl pk-yrs)     Types: Cigarettes    Smokeless tobacco: Never    Tobacco comments:     2 years in my late teens   Vaping Use    Vaping status: Never Used   Substance and Sexual Activity    Alcohol use: Yes     Alcohol/week: 2.0 standard drinks of alcohol     Types: 2 Glasses of wine per week     Comment: 3x week    Drug use: No    Sexual activity: Yes     Partners: Male     Birth control/protection: None     Comment: Menopausal         Current Outpatient Medications:     DULoxetine (CYMBALTA) 60 mg delayed release capsule, Take 1 PO HS., Disp: 90 capsule, Rfl: 3    propranolol (INNOPRAN XL) 80 MG 24 hr capsule, Take 1 capsule by mouth daily, Disp: , Rfl:     SUMAtriptan (IMITREX) 25 mg tablet, TAKE 1 TABLET ORALLY AT ONSET OF SEVERE HEADACHE. MAY REPEAT DOSE AFTER 2 HOURS IF NEEDED, Disp: , Rfl:     Allergies   Allergen Reactions    Bee Venom Anaphylaxis    Penicillins Rash       Physical Exam:    /66 (BP Location: Right arm, Patient Position: Sitting, Cuff Size: Adult)   Temp 97.6 °F (36.4 °C)   Ht 5' 8\" (1.727 m) Comment: verbal  Wt 83.3 kg (183 lb 9.6 oz)   BMI 27.92 kg/m²     Constitutional:normal, well developed, well nourished, alert, in no distress and non-toxic and no overt pain behavior.  Eyes:anicteric  HEENT:grossly intact  Neck:supple, symmetric, trachea midline and no masses   Pulmonary:even and unlabored  Cardiovascular:No edema or pitting edema present  Skin:Normal without rashes or lesions and well hydrated  Psychiatric:Mood and affect appropriate  Neurologic:Cranial Nerves II-XII grossly intact  Musculoskeletal:normal      Imaging  MRI LUMBAR SPINE WITHOUT CONTRAST     INDICATION: M62.830: Muscle spasm of back  M53.3: Sacrococcygeal disorders, not elsewhere classified  M54.16: Radiculopathy, lumbar region .  Lifting injury.  Left leg and foot pain/numbness.     COMPARISON:  None.     TECHNIQUE:  Sagittal T1, sagittal T2, sagittal inversion recovery, axial T1 and axial T2, coronal T2.     "   IMAGE QUALITY:  Diagnostic     FINDINGS:     ALIGNMENT:  Normal alignment of the lumbar spine.  No compression fracture.  No spondylolysis or spondylolisthesis.  No scoliosis.     MARROW SIGNAL:  Normal marrow signal is identified within the visualized bony structures.  No discrete marrow lesion.     DISTAL CORD AND CONUS:  Normal size and signal within the distal cord and conus.  The conus ends at the L2 level.     PARASPINAL SOFT TISSUES:  Paraspinal soft tissues are unremarkable.     SACRUM:  Normal signal within the sacrum. No evidence of insufficiency or stress fracture.     LOWER THORACIC DISC SPACES:  Normal disc height and signal.  No disc herniation, canal stenosis or foraminal narrowing.     LUMBAR DISC SPACES:     L1-L2:  There is a small left subarticular disc herniation minimally distorting the ventral aspect of the thecal sac, series 5 image 3.  The canal is widely patent.  No nerve impingement.  No foraminal narrowing.     L2-L3:  Moderate broad-based left subarticular and foraminal disc protrusion.  This disc herniation distorts the left anterolateral aspect of the thecal sac.  Only slight left foraminal narrowing without discrete nerve impingement.     L3-L4:  Normal disc height and signal.  No disc herniation, canal stenosis or foraminal narrowing.     L4-L5:  Disc desiccation with slight loss of disc height.  Moderate diffuse annular bulging with a broad-based central, left paracentral and subarticular disc herniation with some degree of extrusion.  There is significant mass effect upon the left   anterolateral aspect of the thecal sac and left L5 nerve.  Only minimal foraminal narrowing without discrete L4 nerve impingement.     L5-S1:  Disc desiccation and loss of disc height.  Small broad-based left foraminal disc protrusion.  Mild endplate hypertrophic change and slight facet arthropathy.  No significant canal stenosis.  Mild left greater than right foraminal narrowing.     IMPRESSION:      Moderately large somewhat lobulated disc herniation/extrusion at L4-5 on the left distorting the anterolateral aspect of the thecal sac and compressing the L5 nerve.  Correlate for corresponding radiculopathy.     Small disc herniations at L1-2, L2-3 and L5-S1 without discrete nerve impingement.     The study was marked in EPIC for significant notification    No orders to display         No orders of the defined types were placed in this encounter.